# Patient Record
Sex: MALE | Race: WHITE | NOT HISPANIC OR LATINO | Employment: OTHER | URBAN - METROPOLITAN AREA
[De-identification: names, ages, dates, MRNs, and addresses within clinical notes are randomized per-mention and may not be internally consistent; named-entity substitution may affect disease eponyms.]

---

## 2017-07-18 ENCOUNTER — ALLSCRIPTS OFFICE VISIT (OUTPATIENT)
Dept: OTHER | Facility: OTHER | Age: 82
End: 2017-07-18

## 2017-07-18 ENCOUNTER — APPOINTMENT (OUTPATIENT)
Dept: LAB | Facility: HOSPITAL | Age: 82
End: 2017-07-18
Attending: UROLOGY
Payer: COMMERCIAL

## 2017-07-18 DIAGNOSIS — C67.8 MALIGNANT NEOPLASM OF OVERLAPPING SITES OF BLADDER (HCC): ICD-10-CM

## 2017-07-18 LAB
BILIRUB UR QL STRIP: NORMAL
CLARITY UR: NORMAL
COLOR UR: YELLOW
GLUCOSE (HISTORICAL): NORMAL
HGB UR QL STRIP.AUTO: NORMAL
KETONES UR STRIP-MCNC: NORMAL MG/DL
LEUKOCYTE ESTERASE UR QL STRIP: NORMAL
NITRITE UR QL STRIP: NORMAL
PH UR STRIP.AUTO: 5.5 [PH]
PROT UR STRIP-MCNC: NORMAL MG/DL
SP GR UR STRIP.AUTO: 1.02
UROBILINOGEN UR QL STRIP.AUTO: 1

## 2017-07-18 PROCEDURE — 88112 CYTOPATH CELL ENHANCE TECH: CPT

## 2017-08-28 ENCOUNTER — APPOINTMENT (EMERGENCY)
Dept: CT IMAGING | Facility: HOSPITAL | Age: 82
End: 2017-08-28
Payer: COMMERCIAL

## 2017-08-28 ENCOUNTER — HOSPITAL ENCOUNTER (EMERGENCY)
Facility: HOSPITAL | Age: 82
Discharge: HOME | End: 2017-08-29
Attending: EMERGENCY MEDICINE | Admitting: EMERGENCY MEDICINE
Payer: COMMERCIAL
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
## 2017-08-28 DIAGNOSIS — F10.929 ACUTE ALCOHOL INTOXICATION (HCC): Primary | ICD-10-CM

## 2017-08-28 LAB — ETHANOL EXG-MCNC: 0.11 MG/DL

## 2017-08-28 PROCEDURE — 70450 CT HEAD/BRAIN W/O DYE: CPT

## 2017-08-28 PROCEDURE — 82075 ASSAY OF BREATH ETHANOL: CPT | Performed by: EMERGENCY MEDICINE

## 2017-08-28 RX ORDER — ATORVASTATIN CALCIUM 40 MG/1
40 TABLET, FILM COATED ORAL
COMMUNITY
Start: 2016-02-03

## 2017-08-28 RX ORDER — CARVEDILOL 12.5 MG/1
12.5 TABLET ORAL
COMMUNITY
Start: 2016-02-03

## 2017-08-28 RX ORDER — LOSARTAN POTASSIUM 100 MG/1
100 TABLET ORAL
COMMUNITY
End: 2020-01-28 | Stop reason: SDUPTHER

## 2017-08-28 RX ORDER — METFORMIN HYDROCHLORIDE 1000 MG/1
1000 TABLET, FILM COATED, EXTENDED RELEASE ORAL
COMMUNITY

## 2017-08-29 VITALS
OXYGEN SATURATION: 94 % | HEART RATE: 76 BPM | RESPIRATION RATE: 16 BRPM | TEMPERATURE: 97.5 F | DIASTOLIC BLOOD PRESSURE: 99 MMHG | SYSTOLIC BLOOD PRESSURE: 173 MMHG

## 2017-08-29 LAB — ETHANOL EXG-MCNC: 0.09 MG/DL

## 2017-08-29 PROCEDURE — 82075 ASSAY OF BREATH ETHANOL: CPT | Performed by: EMERGENCY MEDICINE

## 2017-08-29 PROCEDURE — 99284 EMERGENCY DEPT VISIT MOD MDM: CPT

## 2017-11-20 ENCOUNTER — APPOINTMENT (OUTPATIENT)
Dept: LAB | Facility: HOSPITAL | Age: 82
End: 2017-11-20
Payer: COMMERCIAL

## 2017-11-20 ENCOUNTER — APPOINTMENT (OUTPATIENT)
Dept: LAB | Facility: HOSPITAL | Age: 82
End: 2017-11-20
Attending: UROLOGY
Payer: COMMERCIAL

## 2017-11-20 ENCOUNTER — ALLSCRIPTS OFFICE VISIT (OUTPATIENT)
Dept: OTHER | Facility: OTHER | Age: 82
End: 2017-11-20

## 2017-11-20 DIAGNOSIS — C67.8 MALIGNANT NEOPLASM OF OVERLAPPING SITES OF BLADDER (HCC): Primary | ICD-10-CM

## 2017-11-20 LAB
BILIRUB UR QL STRIP: NORMAL
CLARITY UR: NORMAL
COLOR UR: YELLOW
GLUCOSE (HISTORICAL): NEGATIVE
HGB UR QL STRIP.AUTO: NEGATIVE
KETONES UR STRIP-MCNC: NORMAL MG/DL
LEUKOCYTE ESTERASE UR QL STRIP: NEGATIVE
NITRITE UR QL STRIP: NEGATIVE
PH UR STRIP.AUTO: 6 [PH]
PROT UR STRIP-MCNC: NORMAL MG/DL
SP GR UR STRIP.AUTO: 1.02
UROBILINOGEN UR QL STRIP.AUTO: 1

## 2017-11-20 PROCEDURE — 88112 CYTOPATH CELL ENHANCE TECH: CPT | Performed by: UROLOGY

## 2018-01-13 VITALS
HEIGHT: 70 IN | DIASTOLIC BLOOD PRESSURE: 88 MMHG | WEIGHT: 223 LBS | BODY MASS INDEX: 31.92 KG/M2 | SYSTOLIC BLOOD PRESSURE: 160 MMHG

## 2018-01-14 VITALS
DIASTOLIC BLOOD PRESSURE: 84 MMHG | WEIGHT: 219.38 LBS | SYSTOLIC BLOOD PRESSURE: 152 MMHG | BODY MASS INDEX: 31.48 KG/M2

## 2018-03-26 ENCOUNTER — OFFICE VISIT (OUTPATIENT)
Dept: UROLOGY | Facility: MEDICAL CENTER | Age: 83
End: 2018-03-26
Payer: COMMERCIAL

## 2018-03-26 VITALS
DIASTOLIC BLOOD PRESSURE: 84 MMHG | SYSTOLIC BLOOD PRESSURE: 138 MMHG | WEIGHT: 221 LBS | BODY MASS INDEX: 30.94 KG/M2 | HEIGHT: 71 IN

## 2018-03-26 DIAGNOSIS — Z85.51 HISTORY OF BLADDER CANCER: ICD-10-CM

## 2018-03-26 DIAGNOSIS — C67.2 MALIGNANT NEOPLASM OF LATERAL WALL OF BLADDER (HCC): Primary | ICD-10-CM

## 2018-03-26 DIAGNOSIS — Z71.89 OTHER SPECIFIED COUNSELING: ICD-10-CM

## 2018-03-26 DIAGNOSIS — Z85.51 HISTORY OF BLADDER CANCER: Primary | ICD-10-CM

## 2018-03-26 LAB
SL AMB  POCT GLUCOSE, UA: NEGATIVE
SL AMB LEUKOCYTE ESTERASE,UA: NEGATIVE
SL AMB POCT BILIRUBIN,UA: NEGATIVE
SL AMB POCT BLOOD,UA: NEGATIVE
SL AMB POCT CLARITY,UA: CLEAR
SL AMB POCT COLOR,UA: YELLOW
SL AMB POCT KETONES,UA: ABNORMAL
SL AMB POCT NITRITE,UA: NEGATIVE
SL AMB POCT PH,UA: 5.5
SL AMB POCT SPECIFIC GRAVITY,UA: 1.02
SL AMB POCT URINE PROTEIN: ABNORMAL
SL AMB POCT UROBILINOGEN: 0.2

## 2018-03-26 PROCEDURE — 52000 CYSTOURETHROSCOPY: CPT | Performed by: UROLOGY

## 2018-03-26 PROCEDURE — 88112 CYTOPATH CELL ENHANCE TECH: CPT | Performed by: PATHOLOGY

## 2018-03-26 PROCEDURE — 81003 URINALYSIS AUTO W/O SCOPE: CPT | Performed by: UROLOGY

## 2018-03-26 PROCEDURE — 99213 OFFICE O/P EST LOW 20 MIN: CPT | Performed by: UROLOGY

## 2018-03-26 RX ORDER — FLUOXETINE HYDROCHLORIDE 40 MG/1
CAPSULE ORAL DAILY
COMMUNITY
Start: 2018-03-05

## 2018-03-26 RX ORDER — METFORMIN HYDROCHLORIDE 500 MG/1
TABLET, EXTENDED RELEASE ORAL DAILY
COMMUNITY
Start: 2018-02-13

## 2018-03-26 RX ORDER — LOSARTAN POTASSIUM AND HYDROCHLOROTHIAZIDE 25; 100 MG/1; MG/1
TABLET ORAL DAILY
COMMUNITY

## 2018-03-26 RX ORDER — UBIDECARENONE 75 MG
CAPSULE ORAL
COMMUNITY

## 2018-03-26 NOTE — PROGRESS NOTES
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)  -     POCT urine dip auto non-scope    History of bladder cancer  -     Cytology, urine    Other orders  -     B Complex Vitamins (B COMPLEX 1 PO); Take 1 tablet by mouth  -     Calcium Carb-Ergocalciferol 250-125 MG-UNIT TABS; Take 1 tablet by mouth daily  -     FLUoxetine (PROzac) 40 MG capsule; daily  -     sitaGLIPtin (JANUVIA) 25 mg tablet; Take by mouth  -     losartan-hydrochlorothiazide (HYZAAR) 100-25 MG per tablet; Take by mouth daily  -     metFORMIN (GLUCOPHAGE-XR) 500 mg 24 hr tablet; daily  -     cyanocobalamin (VITAMIN B-12) 100 mcg tablet; Take by mouth          Subjective:      Patient ID: Ariella Campbell is a 80 y o  male  HPI    Bladder cancer: 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  His recent positive biopsy was in March 2016 when he 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  Four other sites were negative  Repeat biopsies in March 2017 showed dysplasia but no neoplasm  Currently the patient has no signs or symptoms of recurrent disease  His cystoscopy is essentially negative  The full report appears below  History of prostate cancer: The patient was radiated for prostate cancer in 2003 for Daniel 9 prostate cancer  PSA at the time was about 16  Currently no evidence of disease  The patient is accompanied by his wife    The following portions of the patient's history were reviewed and updated as appropriate: allergies, current medications, past family history, past medical history, past social history, past surgical history and problem list     Review of Systems   Constitutional: Negative for activity change and fatigue  Respiratory: Negative for shortness of breath and wheezing  Cardiovascular: Negative for chest pain  Gastrointestinal: Negative for abdominal pain  Genitourinary: Negative for difficulty urinating, dysuria, frequency, hematuria and urgency  Musculoskeletal: Negative for back pain and gait problem  Skin: Negative  Allergic/Immunologic: Negative  Neurological: Negative  Psychiatric/Behavioral: Negative  Objective:      /84 (BP Location: Left arm, Patient Position: Sitting, Cuff Size: Standard)   Ht 5' 11" (1 803 m)   Wt 100 kg (221 lb)   BMI 30 82 kg/m²          Physical Exam   Constitutional: He is oriented to person, place, and time  He appears well-developed and well-nourished  HENT:   Head: Normocephalic and atraumatic  Neck: Normal range of motion  Neck supple  Pulmonary/Chest: Effort normal    Genitourinary:   Genitourinary Comments: Normal penis, testes, scrotum   Musculoskeletal: Normal range of motion  Neurological: He is alert and oriented to person, place, and time  He has normal reflexes  Skin: Skin is warm and dry  Psychiatric: He has a normal mood and affect  His behavior is normal  Judgment and thought content normal                MALE FLEXIBLE CYSTOSCOPY    The patient was prepped and draped in sterile and 10 mL of 1% xylocaine jelly was instilled into the urethra  A penile clamp was applied  Penile Urethra:normal  Bulbar Urethra:normal  Prostate: Showed evidence of prior radiation therapy for prostate cancer  The lumen is patent  There is pallor of the mucosa  Bladder:        Bladder neck normal       Ureteral orifices normal       Mucosa generally normal with some patchy hyperemia on the floor consistent with post radiation effect, no obvious neoplasm         Trabeculation moderate  Impression:  No visible recurrent bladder cancer

## 2018-03-26 NOTE — LETTER
March 26, 2018     Maryan RyanDO  [facility] [id number]    Patient: Que Porter   YOB: 1934   Date of Visit: 3/26/2018       Dear Dr Grady Gardner: Thank you for referring Eda Ortega to me for evaluation  Below are my notes for this consultation  If you have questions, please do not hesitate to call me  I look forward to following your patient along with you  Sincerely,        Noemi Henning MD        CC: No Recipients  Noemi Henning MD  3/26/2018  5:11 PM  Sign at close encounter  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)  -     POCT urine dip auto non-scope    History of bladder cancer  -     Cytology, urine    Other orders  -     B Complex Vitamins (B COMPLEX 1 PO); Take 1 tablet by mouth  -     Calcium Carb-Ergocalciferol 250-125 MG-UNIT TABS; Take 1 tablet by mouth daily  -     FLUoxetine (PROzac) 40 MG capsule; daily  -     sitaGLIPtin (JANUVIA) 25 mg tablet; Take by mouth  -     losartan-hydrochlorothiazide (HYZAAR) 100-25 MG per tablet; Take by mouth daily  -     metFORMIN (GLUCOPHAGE-XR) 500 mg 24 hr tablet; daily  -     cyanocobalamin (VITAMIN B-12) 100 mcg tablet; Take by mouth          Subjective:      Patient ID: Que Porter is a 80 y o  male  HPI    Bladder cancer: 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  His recent positive biopsy was in March 2016 when he 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  Four other sites were negative  Repeat biopsies in March 2017 showed dysplasia but no neoplasm  Currently the patient has no signs or symptoms of recurrent disease    His cystoscopy is essentially negative  The full report appears below  History of prostate cancer: The patient was radiated for prostate cancer in 2003 for Daniel 9 prostate cancer  PSA at the time was about 16  Currently no evidence of disease  The patient is accompanied by his wife  The following portions of the patient's history were reviewed and updated as appropriate: allergies, current medications, past family history, past medical history, past social history, past surgical history and problem list     Review of Systems   Constitutional: Negative for activity change and fatigue  Respiratory: Negative for shortness of breath and wheezing  Cardiovascular: Negative for chest pain  Gastrointestinal: Negative for abdominal pain  Genitourinary: Negative for difficulty urinating, dysuria, frequency, hematuria and urgency  Musculoskeletal: Negative for back pain and gait problem  Skin: Negative  Allergic/Immunologic: Negative  Neurological: Negative  Psychiatric/Behavioral: Negative  Objective:      /84 (BP Location: Left arm, Patient Position: Sitting, Cuff Size: Standard)   Ht 5' 11" (1 803 m)   Wt 100 kg (221 lb)   BMI 30 82 kg/m²           Physical Exam   Constitutional: He is oriented to person, place, and time  He appears well-developed and well-nourished  HENT:   Head: Normocephalic and atraumatic  Neck: Normal range of motion  Neck supple  Pulmonary/Chest: Effort normal    Genitourinary:   Genitourinary Comments: Normal penis, testes, scrotum   Musculoskeletal: Normal range of motion  Neurological: He is alert and oriented to person, place, and time  He has normal reflexes  Skin: Skin is warm and dry  Psychiatric: He has a normal mood and affect  His behavior is normal  Judgment and thought content normal                MALE FLEXIBLE CYSTOSCOPY    The patient was prepped and draped in sterile and 10 mL of 1% xylocaine jelly was instilled into the urethra    A penile clamp was applied  Penile Urethra:normal  Bulbar Urethra:normal  Prostate: Showed evidence of prior radiation therapy for prostate cancer  The lumen is patent  There is pallor of the mucosa  Bladder:        Bladder neck normal       Ureteral orifices normal       Mucosa generally normal with some patchy hyperemia on the floor consistent with post radiation effect, no obvious neoplasm         Trabeculation moderate  Impression:  No visible recurrent bladder cancer

## 2018-03-26 NOTE — PROGRESS NOTES
IPSS Questionnaire (AUA-7): Over the past month    1)  How often have you had a sensation of not emptying your bladder completely after you finish urinating? 1 - Less than 1 time in 5   2)  How often have you had to urinate again less than two hours after you finished urinating? 1 - Less than 1 time in 5   3)  How often have you found you stopped and started again several times when you urinated? 2 - Less than half the time   4) How difficult have you found it to postpone urination? 1 - Less than 1 time in 5   5) How often have you had a weak urinary stream?  2 - Less than half the time   6) How often have you had to push or strain to begin urination? 1 - Less than 1 time in 5   7) How many times did you most typically get up to urinate from the time you went to bed until the time you got up in the morning? 2 - 2 times   Total Score:  10     QOL: Pleased

## 2018-09-22 ENCOUNTER — HOSPITAL ENCOUNTER (EMERGENCY)
Facility: HOSPITAL | Age: 83
Discharge: HOME | End: 2018-09-22
Attending: EMERGENCY MEDICINE
Payer: COMMERCIAL

## 2018-09-22 VITALS
WEIGHT: 211.64 LBS | SYSTOLIC BLOOD PRESSURE: 163 MMHG | RESPIRATION RATE: 18 BRPM | OXYGEN SATURATION: 95 % | TEMPERATURE: 98.5 F | HEART RATE: 78 BPM | DIASTOLIC BLOOD PRESSURE: 74 MMHG | BODY MASS INDEX: 29.52 KG/M2

## 2018-09-22 DIAGNOSIS — R42 EPISODIC LIGHTHEADEDNESS: ICD-10-CM

## 2018-09-22 DIAGNOSIS — R11.0 NAUSEA: Primary | ICD-10-CM

## 2018-09-22 DIAGNOSIS — T50.Z95A ADVERSE EFFECT OF VACCINE, INITIAL ENCOUNTER: ICD-10-CM

## 2018-09-22 LAB
BILIRUB UR QL STRIP: NEGATIVE
CLARITY UR: CLEAR
COLOR UR: YELLOW
COLOR, POC: YELLOW
GLUCOSE UR STRIP-MCNC: NEGATIVE MG/DL
HGB UR QL STRIP.AUTO: NEGATIVE
KETONES UR STRIP-MCNC: NEGATIVE MG/DL
LEUKOCYTE ESTERASE UR QL STRIP: NEGATIVE
NITRITE UR QL STRIP: NEGATIVE
PH UR STRIP.AUTO: 5.5 [PH] (ref 4.5–8)
PROT UR STRIP-MCNC: NEGATIVE MG/DL
SP GR UR STRIP.AUTO: 1.02 (ref 1–1.03)
UROBILINOGEN UR QL STRIP.AUTO: 0.2 E.U./DL

## 2018-09-22 PROCEDURE — 93005 ELECTROCARDIOGRAM TRACING: CPT

## 2018-09-22 PROCEDURE — 81003 URINALYSIS AUTO W/O SCOPE: CPT

## 2018-09-22 PROCEDURE — 99284 EMERGENCY DEPT VISIT MOD MDM: CPT

## 2018-09-22 RX ORDER — ONDANSETRON 4 MG/1
4 TABLET, ORALLY DISINTEGRATING ORAL EVERY 8 HOURS PRN
Qty: 20 TABLET | Refills: 0 | Status: SHIPPED | OUTPATIENT
Start: 2018-09-22 | End: 2018-09-29

## 2018-09-22 RX ORDER — ONDANSETRON 4 MG/1
4 TABLET, ORALLY DISINTEGRATING ORAL ONCE
Status: COMPLETED | OUTPATIENT
Start: 2018-09-22 | End: 2018-09-22

## 2018-09-22 RX ADMIN — ONDANSETRON 4 MG: 4 TABLET, ORALLY DISINTEGRATING ORAL at 22:18

## 2018-09-23 NOTE — DISCHARGE INSTRUCTIONS
Acute Nausea and Vomiting   WHAT YOU NEED TO KNOW:   Acute nausea and vomiting start suddenly, worsen quickly, and last a short time  DISCHARGE INSTRUCTIONS:   Seek care immediately if:   · You see blood in your vomit or your bowel movements  · You have sudden, severe pain in your chest and upper abdomen after hard vomiting or retching  · You have swelling in your neck and chest      · You are dizzy, cold, and thirsty and your eyes and mouth are dry  · You are urinating very little or not at all  · You have muscle weakness, leg cramps, and trouble breathing  · Your heart is beating much faster than normal      · You continue to vomit for more than 48 hours  Contact your healthcare provider if:   · You have frequent dry heaves (vomiting but nothing comes out)  · Your nausea and vomiting does not get better or go away after you use medicine  · You have questions or concerns about your condition or treatment  Medicines: You may need any of the following:  · Medicines  may be given to calm your stomach and stop your vomiting  You may also need medicines to help you feel more relaxed or to stop nausea and vomiting caused by motion sickness  · Gastrointestinal stimulants  are used to help empty your stomach and bowels  This may help decrease nausea and vomiting  · Take your medicine as directed  Contact your healthcare provider if you think your medicine is not helping or if you have side effects  Tell him or her if you are allergic to any medicine  Keep a list of the medicines, vitamins, and herbs you take  Include the amounts, and when and why you take them  Bring the list or the pill bottles to follow-up visits  Carry your medicine list with you in case of an emergency  Prevent or manage acute nausea and vomiting:   · Do not drink alcohol  Alcohol may upset or irritate your stomach  Too much alcohol can also cause acute nausea and vomiting  · Control stress    Headaches due to stress may cause nausea and vomiting  Find ways to relax and manage your stress  Get more rest and sleep  · Drink more liquids as directed  Vomiting can lead to dehydration  It is important to drink more liquids to help replace lost body fluids  Ask your healthcare provider how much liquid to drink each day and which liquids are best for you  Your provider may recommend that you drink an oral rehydration solution (ORS)  ORS contains water, salts, and sugar that are needed to replace the lost body fluids  Ask what kind of ORS to use, how much to drink, and where to get it  · Eat smaller meals, more often  Eat small amounts of food every 2 to 3 hours, even if you are not hungry  Food in your stomach may decrease your nausea  · Talk to your healthcare provider before you take over-the-counter (OTC) medicines  These medicines can cause serious problems if you use certain other medicines, or you have a medical condition  You may have problems if you use too much or use them for longer than the label says  Follow directions on the label carefully  Follow up with your healthcare provider as directed:  Write down your questions so you remember to ask them during your visits  © 2017 [employer]  Information is for End User's use only and may not be sold, redistributed or otherwise used for commercial purposes  All illustrations and images included in CareNotes® are the copyrighted property of [employer]  or [employer]  The above information is an  only  It is not intended as medical advice for individual conditions or treatments  Talk to your doctor, nurse or pharmacist before following any medical regimen to see if it is safe and effective for you  Lightheadedness   WHAT YOU NEED TO KNOW:   Lightheadedness is the feeling that you may faint, but you do not  Your heartbeat may be fast or feel like it flutters   Lightheadedness may occur when you take certain medicines, such as medicine to lower your blood pressure  Dehydration, low sodium, low blood sugar, an abnormal heart rhythm, and anxiety are other common causes  DISCHARGE INSTRUCTIONS:   Return to the emergency department if:   · You have sudden chest pain  · You have trouble breathing or shortness of breath  · You have vision changes, are sweating, and have nausea while you are sitting or lying down  · You feel flushed and your heart is fluttering  · You faint  Contact your healthcare provider if:   · You feel lightheaded often  · Your heart beats faster or slower than usual      · You have questions or concerns about your condition or care  Follow up with your healthcare provider as directed: You may need more tests to help find the cause of your lightheadedness  The tests will help healthcare providers plan the best treatment for you  Write down your questions so you remember to ask them during your visits  Self-care:  Talk with your healthcare provider about these and other ways to manage your symptoms:  · Lie down  when you feel lightheaded, your throat gets tight, or your vision changes  Raise your legs above the level of your heart  · Stand up slowly  Sit on the side of the bed or couch for a few minutes before you stand up  · Take slow, deep breaths when you feel lightheaded  This can help decrease the feeling that you might faint  · Ask if you need to avoid hot baths and saunas  These may make your symptoms worse  Watch for signs of low blood sugar: These include hunger, nervousness, sweating, and fast or fluttery heartbeats  Talk with your healthcare provider about ways to keep your blood sugar level steady  Check your blood pressure often:  You should do this especially if you take medicine to lower your blood pressure  Check your blood pressure when you are lying down and when you are standing  Ask how often to check during the day   Keep a record of your blood pressure numbers  Your healthcare provider may use the record to help plan your treatment  Keep a record of your lightheadedness episodes:  Include your symptoms and your activity before and after the episode  The record can help your healthcare provider find the cause of your lightheadedness and help you manage episodes  © 2017 [employer] Information is for End User's use only and may not be sold, redistributed or otherwise used for commercial purposes  All illustrations and images included in CareNotes® are the copyrighted property of [employer]  or [employer] Casiano  The above information is an  only  It is not intended as medical advice for individual conditions or treatments  Talk to your doctor, nurse or pharmacist before following any medical regimen to see if it is safe and effective for you

## 2018-09-23 NOTE — ED NOTES
Pt given cracker and water   Will PO challenge and ambulate Pt again before discharging     Eliana Walters RN  09/22/18 [id number]

## 2018-09-23 NOTE — ED NOTES
Ambulated well with no problems  States he has no nausea   Comfortable with going home     [place]  09/22/18 [id number]

## 2018-09-23 NOTE — ED PROVIDER NOTES
History  Chief Complaint   Patient presents with    Medical Problem     Had flu shot this AM  Nausea and lightheaded PTA  Pt arrived A+Ox3, denies SOB, pain, nausea, or any complaints  79 yo male who got the flu shot around noon today and this evening began with nausea and lightheadedness  Pt reports symptoms lasted an hour - EMS called and currently feels fine  Wants to go home  VSS, well hydrated, nml exam, NAD  History provided by:  Patient   used: No    Medical Problem   Severity:  Moderate  Onset quality:  Gradual  Duration:  1 hour  Timing:  Constant  Progression:  Resolved  Chronicity:  New  Associated symptoms: nausea    Associated symptoms: no abdominal pain, no chest pain, no congestion, no cough, no diarrhea, no ear pain, no fatigue, no fever, no headaches, no loss of consciousness, no myalgias, no rash, no rhinorrhea, no shortness of breath, no sore throat, no vomiting and no wheezing    Associated symptoms comment:  Lightheaded      Prior to Admission Medications   Prescriptions Last Dose Informant Patient Reported? Taking? B Complex Vitamins (B COMPLEX 1 PO)   Yes No   Sig: Take 1 tablet by mouth   Calcium Carb-Ergocalciferol 250-125 MG-UNIT TABS   Yes No   Sig: Take 1 tablet by mouth daily   FLUOXETINE HCL PO   Yes No   Sig: Take 20 mg by mouth   FLUoxetine (PROzac) 40 MG capsule   Yes No   Sig: daily   aspirin 81 MG tablet   Yes No   Sig: Take 81 mg by mouth daily     atorvastatin (LIPITOR) 40 mg tablet   Yes No   Sig: Take 40 mg by mouth   carvedilol (COREG) 12 5 mg tablet   Yes No   Sig: Take 12 5 mg by mouth   cyanocobalamin (VITAMIN B-12) 100 mcg tablet   Yes No   Sig: Take by mouth   losartan (COZAAR) 100 MG tablet   Yes No   Sig: Take 100 mg by mouth   losartan-hydrochlorothiazide (HYZAAR) 100-25 MG per tablet   Yes No   Sig: Take by mouth daily   metFORMIN (GLUCOPHAGE-XR) 500 mg 24 hr tablet   Yes No   Sig: daily   metFORMIN (GLUMETZA) 1000 MG (MOD) 24 hr tablet   Yes No   Sig: Take 1,000 mg by mouth   sitaGLIPtin (JANUVIA) 25 mg tablet   Yes No   Sig: Take by mouth      Facility-Administered Medications: None       Past Medical History:   Diagnosis Date    Bladder trabeculation     Diabetes mellitus ([facility] )     Hyperlipidemia     Hypertension     Irradiation cystitis     [facility]     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)     Malignant ne[place] of overlapping sites of bladder ([place] [address] )     Malignant neoplasm prostate ([place] [address] )     Nocturia     Stroke ([place] [address] )     Type 2 diabetes mellitus ([place] [address] )        Past Surgical History:   Procedure Laterality Date    APPENDECTOMY      BLADDER FULGURATION  2010    < 0 5 cm    CATARACT EXTRACTION, BILATERAL Bilateral     CYSTOSCOPY  2011, 2012, 2013, 2014, 2015, 2016, 2017    CYSTOSCOPY W/ RETROGRADES Bilateral 2015    CYSTOSTOMY W/ BLADDER BIOPSY  2015, 2016    GASTRECTOMY      GASTRIC BYPASS      for obesity  [id number]  [facility] [place] BLADDER TUMOR  2002, 2009, 2010       History reviewed  No pertinent family history  I have reviewed and agree with the history as documented  Social History   Substance Use Topics    Smoking status: Former Smoker     Types: Cigarettes     Quit date: 1997    Smokeless tobacco: Never Used    Alcohol use Yes      Comment: [email or web address]        Review of Systems   Constitutional: Negative for chills, fatigue and fever  HENT: Negative for congestion, ear pain, rhinorrhea and sore throat  Eyes: Negative for visual disturbance  Respiratory: Negative for cough, shortness of breath and wheezing  Cardiovascular: Negative for chest pain and palpitations  Gastrointestinal: Positive for nausea  Negative for abdominal pain, diarrhea and vomiting  Genitourinary: Negative for dysuria  Musculoskeletal: Negative for myalgias, neck pain and neck stiffness  Skin: Negative for pallor and rash  Neurological: Positive for light-headedness  Negative for dizziness, seizures, loss of consciousness, facial asymmetry, speech difficulty and headaches  Psychiatric/Behavioral: Negative for confusion  All other systems reviewed and are negative  Physical Exam  Physical Exam   Constitutional: He is oriented to person, place, and time  He appears well-developed and well-nourished  No distress  HENT:   Head: Normocephalic and atraumatic  Right Ear: External ear normal    Left Ear: External ear normal    Mouth/Throat: Oropharynx is clear and moist    Eyes: EOM are normal  Pupils are equal, round, and reactive to light  Neck: Normal range of motion  Neck supple  Cardiovascular: Normal rate and regular rhythm  No murmur heard  Pulmonary/Chest: Effort normal and breath sounds normal  He exhibits no tenderness  Abdominal: Soft  Bowel sounds are normal  He exhibits no distension  There is no tenderness  Musculoskeletal: Normal range of motion  He exhibits no edema  Neurological: He is alert and oriented to person, place, and time  He displays normal reflexes  No cranial nerve deficit or sensory deficit  He exhibits normal muscle tone  Coordination normal    Skin: Skin is warm  Capillary refill takes less than 2 seconds  No rash noted  No pallor  Psychiatric: He has a normal mood and affect  His behavior is normal    Nursing note and vitals reviewed        Vital Signs  ED Triage Vitals [09/22/18 2200]   Temperature Pulse Respirations Blood Pressure SpO2   98 5 °F (36 9 °C) 78 18 163/74 95 %      Temp src Heart Rate Source Patient Position - Orthostatic VS BP Location FiO2 (%)   -- -- Lying Left arm --      Pain Score       No Pain           Vitals:    09/22/18 2200   BP: 163/74   Pulse: 78   Patient Position - Orthostatic VS: Lying       Visual Acuity  Visual Acuity      Most Recent Value   L Pupil Size (mm)  6   R Pupil Size (mm)  6          ED Medications  Medications   ondansetron (ZOFRAN-ODT) dispersible tablet 4 mg (4 mg Oral Given 9/22/18 2218)       Diagnostic Studies  Results Reviewed     Procedure Component Value Units Date/Time    POCT urinalysis dipstick [[id number]]  (Normal) Resulted:  09/22/18 2237    Lab Status:  Final result Updated:  09/22/18 2237     Color, UA yellow    ED Urine Macroscopic [[id number]] Collected:  09/22/18 2243    Lab Status:  Final result Specimen:  Urine Updated:  09/22/18 2233     Color, UA Yellow     Clarity, UA Clear     pH, UA 5 5     Leukocytes, UA Negative     Nitrite, UA Negative     Protein, UA Negative mg/dl      Glucose, UA Negative mg/dl      Ketones, UA Negative mg/dl      Urobilinogen, UA 0 2 E U /dl      Bilirubin, UA Negative     Blood, UA Negative     Specific Gravity, UA 1 020    Narrative:       CLINITEK RESULT                 No orders to display              Procedures  ECG 12 Lead Documentation  Date/Time: 9/22/2018 10:05 PM  Performed by: Jamshid Siddiqui by: Arron Vasquez     Indications / Diagnosis:  Nausea  Patient location:  ED  Interpretation:     Interpretation: normal    Rate:     ECG rate:  76    ECG rate assessment: normal    Rhythm:     Rhythm: sinus rhythm    Ectopy:     Ectopy: none    QRS:     QRS axis:  Left    QRS intervals: Wide (RBBB)  Conduction:     Conduction: normal    ST segments:     ST segments:  Normal  T waves:     T waves: normal    Other findings:     Other findings: LVH             Phone Contacts  ED Phone Contact    ED Course  ED Course as of Sep 23 0400   Sat Sep 22, 2018   2203 Pt seen and examined  79 yo male who got the flu shot around noon today and this evening began with nausea and lightheadedness  Pt reports symptoms lasted an hour - EMS called and currently feels fine  Wants to go home  VSS, well hydrated, nml exam, NAD  Will ambulate  2217 Pt ambulated without any difficulty, c/o mild nausea - ODT zofran ordered  18 Pt no longer with nausea and wishes to go, will d/c home with wife and son  MDM  CritCare Time    Disposition  Final diagnoses:   Nausea   Episodic lightheadedness   Adverse effect of vaccine, initial encounter     Time reflects when diagnosis was documented in both MDM as applicable and the Disposition within this note     Time User Action Codes Description Comment    9/22/2018 10:36 PM [place] Covert Add [R11 0] Nausea     9/22/2018 10:36 PM Santiago Covert Add [R42] Episodic lightheadedness     9/22/2018 10:37 PM Santiago Covert Add [T50  Z95A] Adverse effect of vaccine, initial encounter       ED Disposition     ED Disposition Condition Comment    Discharge  Yassine Garcias discharge to home/self care  Condition at discharge: Good        Follow-up Information     Follow up With Specialties Details Why Contact Pino Cook, DO Family Medicine Call As needed Juma [address]            Discharge Medication List as of 9/22/2018 10:37 PM      START taking these medications    Details   ondansetron (ZOFRAN-ODT) 4 mg disintegrating tablet Take 1 tablet (4 mg total) by mouth every 8 (eight) hours as needed for nausea or vomiting for up to 7 days, Starting Sat 9/22/2018, Until Sat 9/29/2018, Print         CONTINUE these medications which have NOT CHANGED    Details   aspirin 81 MG tablet Take 81 mg by mouth daily  , Until Discontinued, Historical Med      atorvastatin (LIPITOR) 40 mg tablet Take 40 mg by mouth, Starting Wed 2/3/2016, Historical Med      B Complex Vitamins (B COMPLEX 1 PO) Take 1 tablet by mouth, Historical Med      Calcium Carb-Ergocalciferol 250-125 MG-UNIT TABS Take 1 tablet by mouth daily, Historical Med      carvedilol (COREG) 12 5 mg tablet Take 12 5 mg by mouth, Starting Wed 2/3/2016, Historical Med      cyanocobalamin (VITAMIN B-12) 100 mcg tablet Take by mouth, Historical Med      !!  FLUoxetine (PROzac) 40 MG capsule daily, Starting Mon 3/5/2018, Historical Med      !! FLUOXETINE HCL PO Take 20 mg by mouth, Historical Med      losartan (COZAAR) 100 MG tablet Take 100 mg by mouth, Historical Med      losartan-hydrochlorothiazide (HYZAAR) 100-25 MG per tablet Take by mouth daily, Historical Med      !! metFORMIN (GLUCOPHAGE-XR) 500 mg 24 hr tablet daily, Starting Tue 2/13/2018, Historical Med      !! metFORMIN (GLUMETZA) 1000 MG (MOD) 24 hr tablet Take 1,000 mg by mouth, Historical Med      sitaGLIPtin (JANUVIA) 25 mg tablet Take by mouth, Historical Med       !! - Potential duplicate medications found  Please discuss with provider  No discharge procedures on file      ED Provider  Electronically Signed by           Jake Hook DO  09/23/18 0352

## 2018-09-24 LAB
ATRIAL RATE: 76 BPM
P AXIS: 53 DEGREES
PR INTERVAL: 200 MS
QRS AXIS: -61 DEGREES
QRSD INTERVAL: 152 MS
QT INTERVAL: 436 MS
QTC INTERVAL: 490 MS
T WAVE AXIS: 66 DEGREES
VENTRICULAR RATE: 76 BPM

## 2018-09-24 PROCEDURE — 93010 ELECTROCARDIOGRAM REPORT: CPT | Performed by: INTERNAL MEDICINE

## 2019-01-25 ENCOUNTER — PROCEDURE VISIT (OUTPATIENT)
Dept: UROLOGY | Facility: MEDICAL CENTER | Age: 84
End: 2019-01-25
Payer: COMMERCIAL

## 2019-01-25 VITALS — HEIGHT: 71 IN | BODY MASS INDEX: 29.4 KG/M2 | WEIGHT: 210 LBS

## 2019-01-25 DIAGNOSIS — C67.2 MALIGNANT NEOPLASM OF LATERAL WALL OF URINARY BLADDER (HCC): Primary | ICD-10-CM

## 2019-01-25 LAB
SL AMB  POCT GLUCOSE, UA: ABNORMAL
SL AMB LEUKOCYTE ESTERASE,UA: ABNORMAL
SL AMB POCT BILIRUBIN,UA: ABNORMAL
SL AMB POCT BLOOD,UA: ABNORMAL
SL AMB POCT CLARITY,UA: CLEAR
SL AMB POCT COLOR,UA: ABNORMAL
SL AMB POCT KETONES,UA: ABNORMAL
SL AMB POCT NITRITE,UA: ABNORMAL
SL AMB POCT PH,UA: 5.5
SL AMB POCT SPECIFIC GRAVITY,UA: 1.02
SL AMB POCT URINE PROTEIN: 30
SL AMB POCT UROBILINOGEN: 0.2

## 2019-01-25 PROCEDURE — 88112 CYTOPATH CELL ENHANCE TECH: CPT | Performed by: PATHOLOGY

## 2019-01-25 PROCEDURE — 81003 URINALYSIS AUTO W/O SCOPE: CPT | Performed by: UROLOGY

## 2019-01-25 PROCEDURE — 52000 CYSTOURETHROSCOPY: CPT | Performed by: UROLOGY

## 2019-01-25 NOTE — LETTER
January 25, 2019     [phone]    Patient: Willam Godinez   YOB: 1934   Date of Visit: 1/25/2019       Dear Dr Mark Pierson: Thank you for referring Abbie Olivares to me for evaluation  Below are my notes for this consultation  If you have questions, please do not hesitate to call me  I look forward to following your patient along with you           Sincerely,        Myranda Aguirre MD        CC: No Recipients

## 2019-01-25 NOTE — PROGRESS NOTES
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  His recent positive biopsy was in March 2016 when he 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  Four other sites were negative  Repeat biopsies in March 2017 showed dysplasia but no neoplasm  Currently the patient has no signs or symptoms of recurrent disease  No visible tumor was seen today on cystoscopy  Full report appears below  Procedures  MALE FLEXIBLE CYSTOSCOPY    Preoperative diagnosis:  History of bladder cancer    Postoperative diagnosis:  Radiation cystitis    Operation:  Flexible cystoscopy    Surgeon:  Shadi Aguillon MD,FACS    Anesthesia:  Xylocaine jelly    Procedure:  Patient was brought to the cystoscopy room and positively identified  The patient was prepped and draped in sterile fashion  Ten mL of 1% xylocaine jelly was instilled into the urethra  A penile clamp was applied  The flexible cystoscope was inserted  Findings are as follows:    Penile Urethra:  normal  Bulbar Urethra:  normal  Prostate:  Extensive radiation effect  Minimal regrowth of lateral lobes  Bladder:   Bladder Neck:  Normal  Ureteral orifices:   Normal  Mucosa:   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   Trabeculation:  mild   PVR:  Minimal  Other findings: The cystoscope was removed  The patient tolerated the procedure well    Following additional consultation to review the findings with the patient, he was discharged from the office in satisfactory condition  Impression:  Radiation cystitis  No visible recurrent tumor  The following portions of the patient's history were reviewed and updated as appropriate: allergies, current medications, past family history, past medical history, past social history, past surgical history and problem list     Review of Systems   Constitutional: Negative for activity change and fatigue  Respiratory: Negative for shortness of breath and wheezing  Cardiovascular: Negative for chest pain  Hypertension  Gastrointestinal: Negative for abdominal pain  Endocrine:        NIDDM  Genitourinary: Negative for difficulty urinating, dysuria, frequency, hematuria and urgency  Musculoskeletal: Negative for back pain and gait problem  Skin: Negative  Allergic/Immunologic: Negative  Neurological: Negative  Psychiatric/Behavioral: Negative  Objective:      Ht 5' 11" (1 803 m)   Wt 95 3 kg (210 lb)   BMI 29 29 kg/m²          Physical Exam   Constitutional: He is oriented to person, place, and time  He appears well-developed and well-nourished  HENT:   Head: Normocephalic and atraumatic  Eyes: EOM are normal    Neck: Normal range of motion  Pulmonary/Chest: Effort normal    Musculoskeletal: Normal range of motion  Neurological: He is alert and oriented to person, place, and time  Skin: Skin is warm and dry  Psychiatric: He has a normal mood and affect   His behavior is normal  Judgment and thought content normal

## 2019-01-25 NOTE — LETTER
January 25, 2019     [phone]    Patient: Yves Rodriguez   YOB: 1934   Date of Visit: 1/25/2019       Dear Dr Dorcas Rivers: Thank you for referring Theodore Victor to me for evaluation  Below are my notes for this consultation  If you have questions, please do not hesitate to call me  I look forward to following your patient along with you           Sincerely,        Stone Uribe MD        CC: No Recipients

## 2019-01-29 ENCOUNTER — TELEPHONE (OUTPATIENT)
Dept: UROLOGY | Facility: MEDICAL CENTER | Age: 84
End: 2019-01-29

## 2019-01-29 NOTE — TELEPHONE ENCOUNTER
----- Message from Drea Jimenez MD sent at 1/29/2019 12:49 PM EST -----  Normal urine cytology  Please  Inform pt  Thank you

## 2020-01-28 ENCOUNTER — PROCEDURE VISIT (OUTPATIENT)
Dept: UROLOGY | Facility: MEDICAL CENTER | Age: 85
End: 2020-01-28
Payer: COMMERCIAL

## 2020-01-28 VITALS
BODY MASS INDEX: 29.35 KG/M2 | WEIGHT: 205 LBS | SYSTOLIC BLOOD PRESSURE: 138 MMHG | HEIGHT: 70 IN | DIASTOLIC BLOOD PRESSURE: 64 MMHG | HEART RATE: 59 BPM

## 2020-01-28 DIAGNOSIS — Z85.51 HISTORY OF BLADDER CANCER: Primary | ICD-10-CM

## 2020-01-28 DIAGNOSIS — Z85.46 HISTORY OF PROSTATE CANCER: ICD-10-CM

## 2020-01-28 DIAGNOSIS — C67.2 MALIGNANT NEOPLASM OF LATERAL WALL OF BLADDER (HCC): ICD-10-CM

## 2020-01-28 DIAGNOSIS — Z71.89 OTHER SPECIFIED COUNSELING: ICD-10-CM

## 2020-01-28 LAB
SL AMB  POCT GLUCOSE, UA: NEGATIVE
SL AMB LEUKOCYTE ESTERASE,UA: NEGATIVE
SL AMB POCT BILIRUBIN,UA: NEGATIVE
SL AMB POCT BLOOD,UA: NEGATIVE
SL AMB POCT CLARITY,UA: CLEAR
SL AMB POCT COLOR,UA: YELLOW
SL AMB POCT KETONES,UA: NEGATIVE
SL AMB POCT NITRITE,UA: NEGATIVE
SL AMB POCT PH,UA: 6
SL AMB POCT SPECIFIC GRAVITY,UA: >=1.03
SL AMB POCT URINE PROTEIN: ABNORMAL
SL AMB POCT UROBILINOGEN: 1

## 2020-01-28 PROCEDURE — 99214 OFFICE O/P EST MOD 30 MIN: CPT | Performed by: UROLOGY

## 2020-01-28 PROCEDURE — 81003 URINALYSIS AUTO W/O SCOPE: CPT | Performed by: UROLOGY

## 2020-01-28 PROCEDURE — 88112 CYTOPATH CELL ENHANCE TECH: CPT | Performed by: PATHOLOGY

## 2020-01-28 PROCEDURE — 52000 CYSTOURETHROSCOPY: CPT | Performed by: UROLOGY

## 2020-01-28 RX ORDER — VALSARTAN 160 MG/1
TABLET ORAL
COMMUNITY
Start: 2020-01-20

## 2020-01-28 NOTE — LETTER
January 29, 2020     [address] [facility] [address]    Patient: Eveline Duckworth   YOB: 1934   Date of Visit: 1/28/2020       Dear Dr Moustapha Cuellar: Thank you for referring Brandi Mcintosh to me for evaluation  Below are my notes for this consultation  If you have questions, please do not hesitate to call me  I look forward to following your patient along with you  Sincerely,        Caleb Engle MD        CC: No Recipients  Caleb Engle MD  1/29/2020  8:52 AM  Incomplete  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)  -     POCT urine dip auto non-scope    History of prostate cancer  -     PSA, Total Screen; Future    Other orders  -     valsartan (DIOVAN) 160 mg tablet          Subjective:      Patient ID: Eveline Duckworth is a 80 y o  male  HPI  History of bladder cancer:  No signs or symptoms of recurrent neoplasm  The patient returns today for surveillance cystoscopy and cytology  Procedures  MALE FLEXIBLE CYSTOSCOPY    Preoperative diagnosis:  History of bladder cancer, radiation cystitis    Postoperative diagnosis:  Same, no visible evidence of recurrent neoplasm    Operation:  Flexible cystoscopy    Surgeon:  Rizwana Villegas MD,FACS    Anesthesia:  Xylocaine jelly    Procedure:  Patient was brought to the cystoscopy room and positively identified  The patient was prepped and draped in sterile fashion  Ten mL of 1% xylocaine jelly was instilled into the urethra  A penile clamp was applied  The flexible cystoscope was inserted    Findings are as follows:    Penile Urethra:  normal  Bulbar Urethra:  normal  Prostate:  Widely patent  Pale mucosa secondary radiation  Bladder:   Bladder Neck:  Normal  Ureteral orifices:   Normal  Mucosa:   Diffuse changes of radiation cystitis  Trabeculation:  mild   PVR:  Minimal  Other findings: The cystoscope was removed  The patient tolerated the procedure well  Following additional consultation to review the findings with the patient, he was discharged from the office in satisfactory condition  Impression:  No evidence of recurrent neoplasm      History of prostate cancer: The patient was diagnosed with Daniel 9 prostate cancer in 2003  Since treatment, he has had no signs or symptoms of recurrence  He has not had a PSA in several years  One has been ordered  He notes nocturia x 3  He denies other significant urinary symptoms  He denies gross hematuria, urinary tract infections or incontinence  He is taking neither medications nor supplements for his symptoms  AUA SYMPTOM SCORE      Most Recent Value   AUA SYMPTOM SCORE   How often have you had a sensation of not emptying your bladder completely after you finished urinating? 1   How often have you had to urinate again less than two hours after you finished urinating? 2   How often have you found you stopped and started again several times when you urinate? 1   How often have you found it difficult to postpone urination? 0   How often have you had a weak urinary stream?  0   How often have you had to push or strain to begin urination? 0   How many times did you most typically get up to urinate from the time you went to bed at night until the time you got up in the morning?   3   Quality of Life: If you were to spend the rest of your life with your urinary condition just the way it is now, how would you feel about that?  1   AUA SYMPTOM SCORE  7        The patient is accompanied by his wife for the consultation portion of the procedure  The following portions of the patient's history were reviewed and updated as appropriate: allergies, current medications, past family history, past medical history, past social history, past surgical history and problem list     Review of Systems   Constitutional: Negative for activity change and fatigue  Respiratory: Negative for shortness of breath and wheezing  Cardiovascular: Negative for chest pain  Hypertension  Gastrointestinal: Negative for abdominal pain  Endocrine:        Non-insulin dependent diabetes  Genitourinary: Negative for difficulty urinating, dysuria, frequency, hematuria and urgency  Musculoskeletal: Negative for back pain and gait problem  Skin: Negative  Allergic/Immunologic: Negative  Neurological: Negative  Psychiatric/Behavioral: Negative  Objective:      /64   Pulse 59   Ht 5' 10" (1 778 m)   Wt 93 kg (205 lb)   BMI 29 41 kg/m²           Physical Exam   Constitutional: He is oriented to person, place, and time  He appears well-developed and well-nourished  HENT:   Head: Normocephalic and atraumatic  Eyes: EOM are normal    Neck: Normal range of motion  Pulmonary/Chest: Effort normal    Genitourinary:   Genitourinary Comments: STAR not performed  Musculoskeletal: Normal range of motion  Neurological: He is alert and oriented to person, place, and time  Skin: Skin is warm and dry  Psychiatric: He has a normal mood and affect  His behavior is normal  Judgment and thought content normal          Eddye MD Francie  1/28/2020 11:29 AM  Sign at close encounter  Assessment/Plan:    No problem-specific Assessment & Plan notes found for this encounter  Diagnoses and all orders for this visit:    History of bladder cancer  -     Cytology, urine;  Future    Malignant neoplasm of lateral wall of urinary bladder (HCC)    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)  -     POCT urine dip auto non-scope    History of prostate cancer  -     PSA, Total Screen; Future    Other orders  -     valsartan (DIOVAN) 160 mg tablet          Subjective:      Patient ID: Flakito Gaytan is a 80 y o  male  HPI  History of bladder cancer:  No signs or symptoms of recurrent neoplasm  The patient returns today for surveillance cystoscopy and cytology  Procedures  MALE FLEXIBLE CYSTOSCOPY    Preoperative diagnosis:  History of bladder cancer, radiation cystitis    Postoperative diagnosis:  Same, no visible evidence of recurrent neoplasm    Operation:  Flexible cystoscopy    Surgeon:  Valentina Dickerson MD,FACS    Anesthesia:  Xylocaine jelly    Procedure:  Patient was brought to the cystoscopy room and positively identified  The patient was prepped and draped in sterile fashion  Ten mL of 1% xylocaine jelly was instilled into the urethra  A penile clamp was applied  The flexible cystoscope was inserted  Findings are as follows:    Penile Urethra:  normal  Bulbar Urethra:  normal  Prostate:  Widely patent  Pale mucosa secondary radiation  Bladder:   Bladder Neck:  Normal  Ureteral orifices:   Normal  Mucosa:   Diffuse changes of radiation cystitis  Trabeculation:  mild   PVR:  Minimal  Other findings: The cystoscope was removed  The patient tolerated the procedure well  Following additional consultation to review the findings with the patient, he was discharged from the office in satisfactory condition  Impression:  No evidence of recurrent neoplasm      History of prostate cancer: The patient was diagnosed with Daniel 9 prostate cancer in 2003  Since treatment, he has had no signs or symptoms of recurrence  He has not had a PSA in several years  One has been ordered  He notes nocturia x 3  He denies other significant urinary symptoms  He denies gross hematuria, urinary tract infections or incontinence  He is taking neither medications nor supplements for his symptoms  AUA SYMPTOM SCORE      Most Recent Value   AUA SYMPTOM SCORE   How often have you had a sensation of not emptying your bladder completely after you finished urinating? 1   How often have you had to urinate again less than two hours after you finished urinating? 2   How often have you found you stopped and started again several times when you urinate? 1   How often have you found it difficult to postpone urination? 0   How often have you had a weak urinary stream?  0   How often have you had to push or strain to begin urination? 0   How many times did you most typically get up to urinate from the time you went to bed at night until the time you got up in the morning? 3   Quality of Life: If you were to spend the rest of your life with your urinary condition just the way it is now, how would you feel about that?  1   AUA SYMPTOM SCORE  7        The patient is accompanied by his wife for the consultation portion of the procedure  The following portions of the patient's history were reviewed and updated as appropriate: allergies, current medications, past family history, past medical history, past social history, past surgical history and problem list     Review of Systems   Constitutional: Negative for activity change and fatigue  Respiratory: Negative for shortness of breath and wheezing  Cardiovascular: Negative for chest pain  Hypertension  Gastrointestinal: Negative for abdominal pain  Endocrine:        Non-insulin dependent diabetes  Genitourinary: Negative for difficulty urinating, dysuria, frequency, hematuria and urgency  Musculoskeletal: Negative for back pain and gait problem  Skin: Negative  Allergic/Immunologic: Negative  Neurological: Negative  Psychiatric/Behavioral: Negative              Objective:      /64   Pulse 59   Ht 5' 10" (1 778 m)   Wt 93 kg (205 lb)   BMI 29 41 kg/m²           Physical Exam   Constitutional: He is oriented to person, place, and time  He appears well-developed and well-nourished  HENT:   Head: Normocephalic and atraumatic  Eyes: EOM are normal    Neck: Normal range of motion  Pulmonary/Chest: Effort normal    Musculoskeletal: Normal range of motion  Neurological: He is alert and oriented to person, place, and time  Skin: Skin is warm and dry  Psychiatric: He has a normal mood and affect   His behavior is normal  Judgment and thought content normal

## 2020-01-28 NOTE — PROGRESS NOTES
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)  -     POCT urine dip auto non-scope    History of prostate cancer  -     PSA, Total Screen; Future    Other orders  -     valsartan (DIOVAN) 160 mg tablet          Subjective:      Patient ID: Galilea Francisco is a 80 y o  male  HPI  History of bladder cancer:  No signs or symptoms of recurrent neoplasm  The patient returns today for surveillance cystoscopy and cytology  Procedures  MALE FLEXIBLE CYSTOSCOPY    Preoperative diagnosis:  History of bladder cancer, radiation cystitis    Postoperative diagnosis:  Same, no visible evidence of recurrent neoplasm    Operation:  Flexible cystoscopy    Surgeon:  Marline Simon MD,FACS    Anesthesia:  Xylocaine jelly    Procedure:  Patient was brought to the cystoscopy room and positively identified  The patient was prepped and draped in sterile fashion  Ten mL of 1% xylocaine jelly was instilled into the urethra  A penile clamp was applied  The flexible cystoscope was inserted  Findings are as follows:    Penile Urethra:  normal  Bulbar Urethra:  normal  Prostate:  Widely patent  Pale mucosa secondary radiation  Bladder:   Bladder Neck:  Normal  Ureteral orifices:   Normal  Mucosa:   Diffuse changes of radiation cystitis  Trabeculation:  mild   PVR:  Minimal  Other findings: The cystoscope was removed  The patient tolerated the procedure well  Following additional consultation to review the findings with the patient, he was discharged from the office in satisfactory condition      Impression:  No evidence of recurrent neoplasm      History of prostate cancer: The patient was diagnosed with Catharpin 9 prostate cancer in 2003  Since treatment, he has had no signs or symptoms of recurrence  He has not had a PSA in several years  One has been ordered  He notes nocturia x 3  He denies other significant urinary symptoms  He denies gross hematuria, urinary tract infections or incontinence  He is taking neither medications nor supplements for his symptoms  AUA SYMPTOM SCORE      Most Recent Value   AUA SYMPTOM SCORE   How often have you had a sensation of not emptying your bladder completely after you finished urinating? 1   How often have you had to urinate again less than two hours after you finished urinating? 2   How often have you found you stopped and started again several times when you urinate? 1   How often have you found it difficult to postpone urination? 0   How often have you had a weak urinary stream?  0   How often have you had to push or strain to begin urination? 0   How many times did you most typically get up to urinate from the time you went to bed at night until the time you got up in the morning? 3   Quality of Life: If you were to spend the rest of your life with your urinary condition just the way it is now, how would you feel about that?  1   AUA SYMPTOM SCORE  7        The patient is accompanied by his wife for the consultation portion of the procedure  The following portions of the patient's history were reviewed and updated as appropriate: allergies, current medications, past family history, past medical history, past social history, past surgical history and problem list     Review of Systems   Constitutional: Negative for activity change and fatigue  Respiratory: Negative for shortness of breath and wheezing  Cardiovascular: Negative for chest pain  Hypertension  Gastrointestinal: Negative for abdominal pain  Endocrine:        Non-insulin dependent diabetes  Genitourinary: Negative for difficulty urinating, dysuria, frequency, hematuria and urgency  Musculoskeletal: Negative for back pain and gait problem  Skin: Negative  Allergic/Immunologic: Negative  Neurological: Negative  Psychiatric/Behavioral: Negative  Objective:      /64   Pulse 59   Ht 5' 10" (1 778 m)   Wt 93 kg (205 lb)   BMI 29 41 kg/m²          Physical Exam   Constitutional: He is oriented to person, place, and time  He appears well-developed and well-nourished  HENT:   Head: Normocephalic and atraumatic  Eyes: EOM are normal    Neck: Normal range of motion  Pulmonary/Chest: Effort normal    Genitourinary:   Genitourinary Comments: STAR not performed  Musculoskeletal: Normal range of motion  Neurological: He is alert and oriented to person, place, and time  Skin: Skin is warm and dry  Psychiatric: He has a normal mood and affect   His behavior is normal  Judgment and thought content normal

## 2020-01-29 PROBLEM — C67.2 MALIGNANT NEOPLASM OF LATERAL WALL OF URINARY BLADDER (HCC): Status: RESOLVED | Noted: 2019-01-25 | Resolved: 2020-01-29

## 2020-01-29 PROBLEM — Z85.51 HISTORY OF BLADDER CANCER: Status: ACTIVE | Noted: 2020-01-29

## 2020-01-29 PROBLEM — Z85.46 HISTORY OF PROSTATE CANCER: Status: ACTIVE | Noted: 2020-01-29

## 2020-01-31 ENCOUNTER — TELEPHONE (OUTPATIENT)
Dept: UROLOGY | Facility: CLINIC | Age: 85
End: 2020-01-31

## 2020-01-31 NOTE — TELEPHONE ENCOUNTER
----- Message from Marline Ferguson MD sent at 1/31/2020  7:38 AM EST -----  Regarding: Urine cytology  Please inform the patient that his urine cytology did not contain any cancer cells  Thank you

## 2020-01-31 NOTE — TELEPHONE ENCOUNTER
Spoke with patients wife, aware cytology showed no cancer cells  All questions answered at this time

## 2021-02-02 DIAGNOSIS — Z85.46 HISTORY OF PROSTATE CANCER: Primary | ICD-10-CM

## 2021-02-09 ENCOUNTER — PROCEDURE VISIT (OUTPATIENT)
Dept: UROLOGY | Facility: MEDICAL CENTER | Age: 86
End: 2021-02-09
Payer: COMMERCIAL

## 2021-02-09 ENCOUNTER — TELEPHONE (OUTPATIENT)
Dept: UROLOGY | Facility: MEDICAL CENTER | Age: 86
End: 2021-02-09

## 2021-02-09 VITALS
SYSTOLIC BLOOD PRESSURE: 124 MMHG | DIASTOLIC BLOOD PRESSURE: 80 MMHG | WEIGHT: 222 LBS | HEIGHT: 71 IN | BODY MASS INDEX: 31.08 KG/M2

## 2021-02-09 DIAGNOSIS — Z85.51 HISTORY OF BLADDER CANCER: Primary | ICD-10-CM

## 2021-02-09 DIAGNOSIS — Z85.46 HISTORY OF PROSTATE CANCER: ICD-10-CM

## 2021-02-09 PROCEDURE — 52000 CYSTOURETHROSCOPY: CPT | Performed by: UROLOGY

## 2021-02-09 PROCEDURE — 81003 URINALYSIS AUTO W/O SCOPE: CPT | Performed by: UROLOGY

## 2021-02-09 NOTE — LETTER
February 9, 2021     [facility] [place] [phone]    Patient: Joshua Neville   YOB: 1934   Date of Visit: 2/9/2021       Dear Dr Sena Session: Thank you for referring Ashley Leiva to me for evaluation  Below are my notes for this consultation  If you have questions, please do not hesitate to call me  I look forward to following your patient along with you  Sincerely,        Mary Krause MD        CC: No Recipients  Mary Krause MD  2/9/2021  1:59 PM  Incomplete  Assessment/Plan:    History of prostate cancer    No evidence of recurrent tumor since definitive treatment in 2003  Reassess in 1 year  History of bladder cancer    No visible recurrent bladder cancer  Repeat cystoscopy in 1 year  History of prostate cancer:  No evidence of recurrent neoplasm 18 years following definitive treatment  Diagnoses and all orders for this visit:    History of prostate cancer    History of bladder cancer          Subjective:      Patient ID: Joshua Neville is a 80 y o  male  HPI  History of bladder cancer: The patient has a long history of recurrent bladder cancer  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, the patient radiation therapy for [place] 9 prostate cancer  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  No signs or symptoms of recurrent neoplasm  The patient returns today for surveillance cystoscopy and cytology  Procedures  MALE FLEXIBLE CYSTOSCOPY    Preoperative diagnosis:  History of bladder cancer, radiation cystitis    Postoperative diagnosis:  Same, no visible evidence of recurrent neoplasm    Operation:  Flexible cystoscopy    Surgeon:  Renee Boxer, MD,FACS    Anesthesia:  Xylocaine jelly    Procedure:  Patient was brought to the cystoscopy room and positively identified    The patient was prepped and draped in sterile fashion  Ten mL of 1% xylocaine jelly was instilled into the urethra  A penile clamp was applied  The flexible cystoscope was inserted  Findings are as follows:    Penile Urethra:  normal  Bulbar Urethra:  normal  Prostate:  Widely patent  Pale mucosa secondary radiation with occasional telangiectasias  Bladder:   Bladder Neck:  Normal  Ureteral orifices:   Normal  Mucosa:   Diffuse changes of radiation cystitis  No visible tumor or areas suspicious for carcinoma in situ  Trabeculation:  mild   PVR:  Minimal  Other findings:   No visible recurrent tumor  The cystoscope was removed  The patient tolerated the procedure well  Following additional consultation to review the findings with the patient, he was discharged from the office in satisfactory condition  Impression:  No evidence of recurrent neoplasm      History of prostate cancer: The patient was diagnosed with Daniel 9 prostate cancer in 2003  Since treatment, he has had no signs or symptoms of recurrence  He has not had a PSA in several years  2021 PSA pending  He notes nocturia x 3  He denies other significant urinary symptoms  He denies gross hematuria, urinary tract infections or incontinence  He is taking neither medications nor supplements for his symptoms  AUA SYMPTOM SCORE      Most Recent Value   AUA SYMPTOM SCORE   How often have you had a sensation of not emptying your bladder completely after you finished urinating? 1   How often have you had to urinate again less than two hours after you finished urinating? 2   How often have you found you stopped and started again several times when you urinate? 1   How often have you found it difficult to postpone urination? 0   How often have you had a weak urinary stream?  0   How often have you had to push or strain to begin urination?   0   How many times did you most typically get up to urinate from the time you went to bed at night until the time you got up in the morning? 3   Quality of Life: If you were to spend the rest of your life with your urinary condition just the way it is now, how would you feel about that?  1   AUA SYMPTOM SCORE  7        The patient is accompanied by his wife for the consultation portion of the procedure  The following portions of the patient's history were reviewed and updated as appropriate: allergies, current medications, past family history, past medical history, past social history, past surgical history and problem list     Review of Systems   Constitutional: Negative for activity change and fatigue  Respiratory: Negative for shortness of breath and wheezing  Cardiovascular: Negative for chest pain  Hypertension  Gastrointestinal: Negative for abdominal pain  Endocrine:        Non-insulin dependent diabetes  Genitourinary: Negative for difficulty urinating, dysuria, frequency, hematuria and urgency  Musculoskeletal: Negative for back pain and gait problem  Skin: Negative  Allergic/Immunologic: Negative  Neurological: Negative  Psychiatric/Behavioral: Negative  Objective:      /80   Ht 5' 11" (1 803 m)   Wt 101 kg (222 lb)   BMI 30 96 kg/m²          Physical Exam  Constitutional:       Appearance: He is well-developed  HENT:      Head: Normocephalic and atraumatic  Neck:      Musculoskeletal: Normal range of motion  Pulmonary:      Effort: Pulmonary effort is normal    Genitourinary:     Comments: STAR not performed  Musculoskeletal: Normal range of motion  Skin:     General: Skin is warm and dry  Neurological:      Mental Status: He is alert and oriented to person, place, and time  Psychiatric:         Behavior: Behavior normal          Thought Content:  Thought content normal          Judgment: Judgment normal            Marguerite Glynn MD  2/9/2021 11:36 AM  Sign when Signing Visit  Assessment/Plan:    No problem-specific Assessment & Plan notes found for this encounter  History of prostate cancer:  No evidence of recurrent neoplasm 18 years following definitive treatment  There are no diagnoses linked to this encounter  Subjective:      Patient ID: Mario Oliveira is a 80 y o  male  HPI  History of bladder cancer:  No signs or symptoms of recurrent neoplasm  The patient returns today for surveillance cystoscopy and cytology  Procedures  MALE FLEXIBLE CYSTOSCOPY    Preoperative diagnosis:  History of bladder cancer, radiation cystitis    Postoperative diagnosis:  Same, no visible evidence of recurrent neoplasm    Operation:  Flexible cystoscopy    Surgeon:  Bertha Phoenix, MD,FACS    Anesthesia:  Xylocaine jelly    Procedure:  Patient was brought to the cystoscopy room and positively identified  The patient was prepped and draped in sterile fashion  Ten mL of 1% xylocaine jelly was instilled into the urethra  A penile clamp was applied  The flexible cystoscope was inserted  Findings are as follows:    Penile Urethra:  normal  Bulbar Urethra:  normal  Prostate:  Widely patent  Pale mucosa secondary radiation  Bladder:   Bladder Neck:  Normal  Ureteral orifices:   Normal  Mucosa:   Diffuse changes of radiation cystitis  Trabeculation:  mild   PVR:  Minimal  Other findings: The cystoscope was removed  The patient tolerated the procedure well  Following additional consultation to review the findings with the patient, he was discharged from the office in satisfactory condition  Impression:  No evidence of recurrent neoplasm      History of prostate cancer: The patient was diagnosed with Daniel 9 prostate cancer in 2003  Since treatment, he has had no signs or symptoms of recurrence  He has not had a PSA in several years  2021 PSA pending  He notes nocturia x 3  He denies other significant urinary symptoms  He denies gross hematuria, urinary tract infections or incontinence    He is taking neither medications nor supplements for his symptoms  AUA SYMPTOM SCORE      Most Recent Value   AUA SYMPTOM SCORE   How often have you had a sensation of not emptying your bladder completely after you finished urinating? 1   How often have you had to urinate again less than two hours after you finished urinating? 2   How often have you found you stopped and started again several times when you urinate? 1   How often have you found it difficult to postpone urination? 0   How often have you had a weak urinary stream?  0   How often have you had to push or strain to begin urination? 0   How many times did you most typically get up to urinate from the time you went to bed at night until the time you got up in the morning? 3   Quality of Life: If you were to spend the rest of your life with your urinary condition just the way it is now, how would you feel about that?  1   AUA SYMPTOM SCORE  7        The patient is accompanied by his wife for the consultation portion of the procedure  The following portions of the patient's history were reviewed and updated as appropriate: allergies, current medications, past family history, past medical history, past social history, past surgical history and problem list     Review of Systems   Constitutional: Negative for activity change and fatigue  Respiratory: Negative for shortness of breath and wheezing  Cardiovascular: Negative for chest pain  Hypertension  Gastrointestinal: Negative for abdominal pain  Endocrine:        Non-insulin dependent diabetes  Genitourinary: Negative for difficulty urinating, dysuria, frequency, hematuria and urgency  Musculoskeletal: Negative for back pain and gait problem  Skin: Negative  Allergic/Immunologic: Negative  Neurological: Negative  Psychiatric/Behavioral: Negative              Objective:      /80   Ht 5' 11" (1 803 m)   Wt 101 kg (222 lb)   BMI 30 96 kg/m²          Physical Exam  Constitutional: Appearance: He is well-developed  HENT:      Head: Normocephalic and atraumatic  Neck:      Musculoskeletal: Normal range of motion  Pulmonary:      Effort: Pulmonary effort is normal    Genitourinary:     Comments: STAR not performed  Musculoskeletal: Normal range of motion  Skin:     General: Skin is warm and dry  Neurological:      Mental Status: He is alert and oriented to person, place, and time  Psychiatric:         Behavior: Behavior normal          Thought Content:  Thought content normal          Judgment: Judgment normal

## 2021-02-09 NOTE — PROGRESS NOTES
Assessment/Plan:    History of prostate cancer    No evidence of recurrent tumor since definitive treatment in 2003  Reassess in 1 year  History of bladder cancer    No visible recurrent bladder cancer  Repeat cystoscopy in 1 year  History of prostate cancer:  No evidence of recurrent neoplasm 18 years following definitive treatment  Diagnoses and all orders for this visit:    History of prostate cancer    History of bladder cancer          Subjective:      Patient ID: Allie Mcguire is a 80 y o  male  HPI  History of bladder cancer: The patient has a long history of recurrent bladder cancer  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, the patient radiation therapy for [place] 9 prostate cancer  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  No signs or symptoms of recurrent neoplasm  The patient returns today for surveillance cystoscopy and cytology  Procedures  MALE FLEXIBLE CYSTOSCOPY    Preoperative diagnosis:  History of bladder cancer, radiation cystitis    Postoperative diagnosis:  Same, no visible evidence of recurrent neoplasm    Operation:  Flexible cystoscopy    Surgeon:  Caprice Royal MD,FACS    Anesthesia:  Xylocaine jelly    Procedure:  Patient was brought to the cystoscopy room and positively identified  The patient was prepped and draped in sterile fashion  Ten mL of 1% xylocaine jelly was instilled into the urethra  A penile clamp was applied  The flexible cystoscope was inserted  Findings are as follows:    Penile Urethra:  normal  Bulbar Urethra:  normal  Prostate:  Widely patent  Pale mucosa secondary radiation with occasional telangiectasias  Bladder:   Bladder Neck:  Normal  Ureteral orifices:   Normal  Mucosa:   Diffuse changes of radiation cystitis  No visible tumor or areas suspicious for carcinoma in situ    Trabeculation:  mild   PVR:  Minimal  Other findings:   No visible recurrent tumor  The cystoscope was removed  The patient tolerated the procedure well  Following additional consultation to review the findings with the patient, he was discharged from the office in satisfactory condition  Impression:  No evidence of recurrent neoplasm      History of prostate cancer: The patient was diagnosed with Daniel 9 prostate cancer in 2003  Since treatment, he has had no signs or symptoms of recurrence  He has not had a PSA in several years  2021 PSA pending  He notes nocturia x 3  He denies other significant urinary symptoms  He denies gross hematuria, urinary tract infections or incontinence  He is taking neither medications nor supplements for his symptoms  AUA SYMPTOM SCORE      Most Recent Value   AUA SYMPTOM SCORE   How often have you had a sensation of not emptying your bladder completely after you finished urinating? 1   How often have you had to urinate again less than two hours after you finished urinating? 2   How often have you found you stopped and started again several times when you urinate? 1   How often have you found it difficult to postpone urination? 0   How often have you had a weak urinary stream?  0   How often have you had to push or strain to begin urination? 0   How many times did you most typically get up to urinate from the time you went to bed at night until the time you got up in the morning? 3   Quality of Life: If you were to spend the rest of your life with your urinary condition just the way it is now, how would you feel about that?  1   AUA SYMPTOM SCORE  7        The patient is accompanied by his wife for the consultation portion of the procedure      The following portions of the patient's history were reviewed and updated as appropriate: allergies, current medications, past family history, past medical history, past social history, past surgical history and problem list     Review of Systems Constitutional: Negative for activity change and fatigue  Respiratory: Negative for shortness of breath and wheezing  Cardiovascular: Negative for chest pain  Hypertension  Gastrointestinal: Negative for abdominal pain  Endocrine:        Non-insulin dependent diabetes  Genitourinary: Negative for difficulty urinating, dysuria, frequency, hematuria and urgency  Musculoskeletal: Negative for back pain and gait problem  Skin: Negative  Allergic/Immunologic: Negative  Neurological: Negative  Psychiatric/Behavioral: Negative  Objective:      /80   Ht 5' 11" (1 803 m)   Wt 101 kg (222 lb)   BMI 30 96 kg/m²          Physical Exam  Constitutional:       Appearance: He is well-developed  HENT:      Head: Normocephalic and atraumatic  Neck:      Musculoskeletal: Normal range of motion  Pulmonary:      Effort: Pulmonary effort is normal    Genitourinary:     Comments: STAR not performed  Musculoskeletal: Normal range of motion  Skin:     General: Skin is warm and dry  Neurological:      Mental Status: He is alert and oriented to person, place, and time  Psychiatric:         Behavior: Behavior normal          Thought Content:  Thought content normal          Judgment: Judgment normal

## 2021-02-09 NOTE — TELEPHONE ENCOUNTER
Call placed to patient and [facility] for him to contact the office to review recommendations of Dr Kip Garcia at this time

## 2021-02-09 NOTE — TELEPHONE ENCOUNTER
----- Message from Janey Mednia MD sent at 2/9/2021  2:02 PM EST -----    The patient's urine sample was not properly labeled today in so we could not send it for cytology  I have placed an order for urine cytology  Please contact the patient and let him know that when he gets his PSA drawn, he should give them a urine sample also  Thank you

## 2021-10-14 ENCOUNTER — TELEPHONE (OUTPATIENT)
Dept: UROLOGY | Facility: MEDICAL CENTER | Age: 86
End: 2021-10-14

## 2021-10-15 ENCOUNTER — TELEPHONE (OUTPATIENT)
Dept: UROLOGY | Facility: MEDICAL CENTER | Age: 86
End: 2021-10-15

## 2021-10-15 DIAGNOSIS — R32 URINARY INCONTINENCE, UNSPECIFIED TYPE: Primary | ICD-10-CM

## 2021-10-28 ENCOUNTER — OFFICE VISIT (OUTPATIENT)
Dept: UROLOGY | Facility: MEDICAL CENTER | Age: 86
End: 2021-10-28
Payer: COMMERCIAL

## 2021-10-28 VITALS
DIASTOLIC BLOOD PRESSURE: 80 MMHG | WEIGHT: 222 LBS | HEART RATE: 78 BPM | BODY MASS INDEX: 31.08 KG/M2 | SYSTOLIC BLOOD PRESSURE: 130 MMHG | HEIGHT: 71 IN

## 2021-10-28 DIAGNOSIS — R35.0 FREQUENCY OF URINATION: ICD-10-CM

## 2021-10-28 DIAGNOSIS — Z85.46 HISTORY OF PROSTATE CANCER: Primary | ICD-10-CM

## 2021-10-28 DIAGNOSIS — R32 URINARY INCONTINENCE, UNSPECIFIED TYPE: ICD-10-CM

## 2021-10-28 LAB
SL AMB  POCT GLUCOSE, UA: ABNORMAL
SL AMB LEUKOCYTE ESTERASE,UA: ABNORMAL
SL AMB POCT BILIRUBIN,UA: ABNORMAL
SL AMB POCT BLOOD,UA: ABNORMAL
SL AMB POCT CLARITY,UA: CLEAR
SL AMB POCT COLOR,UA: YELLOW
SL AMB POCT KETONES,UA: ABNORMAL
SL AMB POCT NITRITE,UA: ABNORMAL
SL AMB POCT PH,UA: 5
SL AMB POCT SPECIFIC GRAVITY,UA: 1.02
SL AMB POCT URINE PROTEIN: ABNORMAL
SL AMB POCT UROBILINOGEN: 0.2

## 2021-10-28 PROCEDURE — 99214 OFFICE O/P EST MOD 30 MIN: CPT | Performed by: UROLOGY

## 2021-10-28 PROCEDURE — 81003 URINALYSIS AUTO W/O SCOPE: CPT | Performed by: UROLOGY

## 2021-10-28 PROCEDURE — 88112 CYTOPATH CELL ENHANCE TECH: CPT | Performed by: PATHOLOGY

## 2021-10-28 RX ORDER — OXYBUTYNIN CHLORIDE 5 MG/1
5 TABLET ORAL 3 TIMES DAILY PRN
Qty: 90 TABLET | Refills: 3 | Status: SHIPPED | OUTPATIENT
Start: 2021-10-28 | End: 2022-03-31

## 2022-01-31 ENCOUNTER — HOSPITAL ENCOUNTER (EMERGENCY)
Facility: HOSPITAL | Age: 87
Discharge: HOME | End: 2022-01-31
Attending: EMERGENCY MEDICINE | Admitting: EMERGENCY MEDICINE
Payer: COMMERCIAL

## 2022-01-31 VITALS
DIASTOLIC BLOOD PRESSURE: 74 MMHG | WEIGHT: 224 LBS | OXYGEN SATURATION: 96 % | RESPIRATION RATE: 16 BRPM | SYSTOLIC BLOOD PRESSURE: 167 MMHG | HEIGHT: 70 IN | HEART RATE: 71 BPM | TEMPERATURE: 97.6 F | BODY MASS INDEX: 32.07 KG/M2

## 2022-01-31 DIAGNOSIS — R21 RASH: Primary | ICD-10-CM

## 2022-01-31 PROCEDURE — 99283 EMERGENCY DEPT VISIT LOW MDM: CPT

## 2022-01-31 PROCEDURE — 99284 EMERGENCY DEPT VISIT MOD MDM: CPT | Performed by: PHYSICIAN ASSISTANT

## 2022-01-31 RX ORDER — FEXOFENADINE HYDROCHLORIDE 60 MG/1
60 TABLET, FILM COATED ORAL 2 TIMES DAILY PRN
Qty: 20 TABLET | Refills: 0 | Status: SHIPPED | OUTPATIENT
Start: 2022-01-31

## 2022-01-31 RX ORDER — CEPHALEXIN 500 MG/1
500 CAPSULE ORAL EVERY 8 HOURS SCHEDULED
Qty: 21 CAPSULE | Refills: 0 | Status: SHIPPED | OUTPATIENT
Start: 2022-01-31 | End: 2022-02-07

## 2022-01-31 NOTE — DISCHARGE INSTRUCTIONS
Please refer to the attached information for strict return instructions  If symptoms worsen or new symptoms develop please return to the ER  Please follow up with dermatology if symptoms persist  Use prescribed medications as instructed

## 2022-01-31 NOTE — ED PROVIDER NOTES
History  Chief Complaint   Patient presents with    Rash     arrived via EMS  pt reports rash b/l legs and back for 2 weeks that is not getting better  St ramos is an 79 yo M, history of DM2, previous history of bladder cancer, presenting with rash to b/l lower extremities and back over the past 1-2 weeks  He notes the rash began on his foot and now involves b/l lower legs, thighs, and low back  He reports the rash is pruritic, notes minimal "stinging" sensation to regions he has frequently scratched at  Denies drainage/discharge  Denies fevers/chills  No previous history of similar  No known exposure to new allergens, medications  Reports he tried switching his soap to sensitive soap without improvement  He reportedly saw his PCP twice for this issue, and was recently prescribed a topical corticosteroid which he has been taking without relief  History provided by:  Patient   used: No        Prior to Admission Medications   Prescriptions Last Dose Informant Patient Reported? Taking? B Complex Vitamins (B COMPLEX 1 PO)  Self Yes No   Sig: Take 1 tablet by mouth   Calcium Carb-Ergocalciferol 250-125 MG-UNIT TABS  Self Yes No   Sig: Take 1 tablet by mouth daily   FLUOXETINE HCL PO  Self Yes No   Sig: Take 20 mg by mouth   Patient not taking: Reported on 10/28/2021   FLUoxetine (PROzac) 40 MG capsule  Self Yes No   Sig: daily   aspirin 81 MG tablet  Self Yes No   Sig: Take 81 mg by mouth daily     atorvastatin (LIPITOR) 40 mg tablet  Self Yes No   Sig: Take 40 mg by mouth   carvedilol (COREG) 12 5 mg tablet  Self Yes No   Sig: Take 12 5 mg by mouth   cyanocobalamin (VITAMIN B-12) 100 mcg tablet  Self Yes No   Sig: Take by mouth   losartan-hydrochlorothiazide (HYZAAR) 100-25 MG per tablet  Self Yes No   Sig: Take by mouth daily   metFORMIN (GLUCOPHAGE-XR) 500 mg 24 hr tablet  Self Yes No   Sig: daily   metFORMIN (GLUMETZA) 1000 MG (MOD) 24 hr tablet  Self Yes No   Sig: Take 1,000 mg by mouth   ondansetron (ZOFRAN-ODT) 4 mg disintegrating tablet   No No   Sig: Take 1 tablet (4 mg total) by mouth every 8 (eight) hours as needed for nausea or vomiting for up to 7 days   Patient not taking: Reported on 10/28/2021   oxybutynin (DITROPAN) 5 mg tablet   No No   Sig: Take 1 tablet (5 mg total) by mouth 3 (three) times a day as needed (urinary frequency and urgency)   sitaGLIPtin (JANUVIA) 25 mg tablet  Self Yes No   Sig: Take 50 mg by mouth    valsartan (DIOVAN) 160 mg tablet   Yes No      Facility-Administered Medications: None       Past Medical History:   Diagnosis Date    Bladder trabeculation     Diabetes mellitus ([facility] [address] [facility]     Hyperlipidemia     Hypertension     Irradiation cystitis     Malignant neoplasm of dome of bladder ([facility]     Malignant neoplasm of lateral wall of bladder ([facility] [address] [facility]     Malignant neoplasm of overlapping sites of bladder ([facility] [address] [facility]     Malignant neoplasm prostate ([facility] [address] [facility]     Nocturia     Stroke ([facility] [address] [facility]     Type 2 diabetes mellitus ([facility] [address] )        Past Surgical History:   Procedure Laterality Date    APPENDECTOMY      BLADDER FULGURATION      < 0 5 cm    CATARACT EXTRACTION, BILATERAL Bilateral     CYSTOSCOPY  , , , 2014, , , 2017    CYSTOSCOPY W/ RETROGRADES Bilateral 2015    CYSTOSTOMY W/ BLADDER BIOPSY  ,     GASTRECTOMY      GASTRIC BYPASS      for obesity   TRANSURETHRAL RESECTION OF BLADDER TUMOR  , ,        Family History   Problem Relation Age of Onset    Heart attack Daughter      I have reviewed and agree with the history as documented  E-Cigarette/Vaping     E-Cigarette/Vaping Substances     Social History     Tobacco Use    Smoking status: Former Smoker     Types: Cigarettes     Quit date:      Years since quittin 0    Smokeless tobacco: Never Used   Substance Use Topics    Alcohol use: Yes     Comment: whiskey    Drug use: No       Review of Systems   Constitutional: Negative for chills and fever  HENT: Negative for congestion, rhinorrhea and sore throat  Eyes: Negative for pain and visual disturbance  Respiratory: Negative for cough, shortness of breath and wheezing  Cardiovascular: Negative for chest pain and palpitations  Gastrointestinal: Negative for abdominal pain, nausea and vomiting  Genitourinary: Negative for dysuria, frequency and urgency  Musculoskeletal: Negative for back pain, neck pain and neck stiffness  Skin: Positive for rash  Negative for wound  Neurological: Negative for dizziness, weakness, light-headedness and numbness  Physical Exam  Physical Exam  Constitutional:       General: He is not in acute distress  Appearance: He is well-developed  He is not diaphoretic  HENT:      Head: Normocephalic and atraumatic  Right Ear: External ear normal       Left Ear: External ear normal    Eyes:      Conjunctiva/sclera: Conjunctivae normal       Pupils: Pupils are equal, round, and reactive to light  Cardiovascular:      Rate and Rhythm: Normal rate and regular rhythm  Heart sounds: Normal heart sounds  No murmur heard  No friction rub  No gallop  Pulmonary:      Effort: Pulmonary effort is normal  No respiratory distress  Breath sounds: Normal breath sounds  No wheezing  Abdominal:      General: There is no distension  Palpations: Abdomen is soft  Tenderness: There is no abdominal tenderness  Musculoskeletal:      Cervical back: Normal range of motion and neck supple  Lymphadenopathy:      Cervical: No cervical adenopathy  Skin:     General: Skin is warm and dry  Capillary Refill: Capillary refill takes less than 2 seconds  Findings: Rash present  No erythema  Comments: Scattered, eczematous appearing rash to bilateral lower legs, scattered to dorsum of feet, back  Occasional wheals noted intermittently to back  Neurological:      Mental Status: He is alert and oriented to person, place, and time        Motor: No abnormal muscle tone  Coordination: Coordination normal    Psychiatric:         Behavior: Behavior normal          Thought Content: Thought content normal          Judgment: Judgment normal              Vital Signs  ED Triage Vitals [01/31/22 1137]   Temperature Pulse Respirations Blood Pressure SpO2   97 6 °F (36 4 °C) 71 16 167/74 96 %      Temp Source Heart Rate Source Patient Position - Orthostatic VS BP Location FiO2 (%)   Oral Monitor Sitting Right arm --      Pain Score       No Pain           Vitals:    01/31/22 1137   BP: 167/74   Pulse: 71   Patient Position - Orthostatic VS: Sitting         Visual Acuity      ED Medications  Medications - No data to display    Diagnostic Studies  Results Reviewed     None                 No orders to display              Procedures  Procedures         ED Course  ED Course as of 01/31/22 1442   Mon Jan 31, 2022   1317 Case discussed with Dr Scott Watson, on call dermatology  Recommends providing with triamcinolone 0 1% ointment BID-QID or betamethasone valerate 0 1% ointment BID  Also recommends oral cephalexin  To follow up with derm if no improvement within 1-2 weeks  SBIRT 20yo+      Most Recent Value   SBIRT (22 yo +)    In order to provide better care to our patients, we are screening all of our patients for alcohol and drug use  Would it be okay to ask you these screening questions? Yes Filed at: 01/31/2022 1145   Initial Alcohol Screen: US AUDIT-C     1  How often do you have a drink containing alcohol? 0 Filed at: 01/31/2022 1145   2  How many drinks containing alcohol do you have on a typical day you are drinking? 0 Filed at: 01/31/2022 1145   3b  FEMALE Any Age, or MALE 65+: How often do you have 4 or more drinks on one occassion? 0 Filed at: 01/31/2022 1145   Audit-C Score 0 Filed at: 01/31/2022 1145   CHRISTIE: How many times in the past year have you        Used an illegal drug or used a prescription medication for non-medical reasons? Never Filed at: 01/31/2022 1145                    [facility]  Number of Diagnoses or Management Options  Rash  Diagnosis management comments: Rash to bilateral lower extremities, back over the past 1-2 weeks  Rash is eczematous, irregular, and with mild associated erythema  Low suspicion for infection/cellulitis  Rash discussed with dermatology as outlined in ED course  Possibly representing asteatotic dermatitis possibly with secondary impetiginization per derm  Provided with topical steroid ointment, antihistamine, keflex per recs  Patient reports he has follow up scheduled with dermatology, advised to maintain  Return to ED indications reviewed  Patient Progress  Patient progress: stable      Disposition  Final diagnoses:   Rash     Time reflects when diagnosis was documented in both MDM as applicable and the Disposition within this note     Time User Action Codes Description Comment    1/31/2022  1:17 PM Carson Danger Add [R21] Rash       ED Disposition     ED Disposition Condition Date/Time Comment    Discharge Stable Mon Jan 31, 2022  1:17 PM Se Soriano discharge to home/self care  Follow-up Information     Follow up With Specialties Details Why Contact Info Additional [address] [facility] Emergency Department Emergency Medicine  If symptoms worsen [facility] [phone]  [facility] Emergency Department, [address] [place] [address], [address] [facility] Dermatology  Please follow up with dermatology within one week if no improvement in symptoms   [address] [phone]  [phone] [address], [address] [facility]          Discharge Medication List as of 1/31/2022  1:22 PM      START taking these medications    Details   cephalexin (KEFLEX) 500 mg capsule Take 1 capsule (500 mg total) by mouth every 8 (eight) hours for 7 days, Starting Mon 1/31/2022, Until Mon 2/7/2022, Normal      fexofenadine (ALLEGRA) 60 MG tablet Take 1 tablet (60 mg total) by mouth 2 (two) times a day as needed (Itchiness, rash), Starting Mon 1/31/2022, Normal      triamcinolone (KENALOG) 0 1 % ointment Apply to rash up to four times daily as needed for itchiness , Normal         CONTINUE these medications which have NOT CHANGED    Details   aspirin 81 MG tablet Take 81 mg by mouth daily  , Historical Med      atorvastatin (LIPITOR) 40 mg tablet Take 40 mg by mouth, Starting Wed 2/3/2016, Historical Med      B Complex Vitamins (B COMPLEX 1 PO) Take 1 tablet by mouth, Historical Med      Calcium Carb-Ergocalciferol 250-125 MG-UNIT TABS Take 1 tablet by mouth daily, Historical Med      carvedilol (COREG) 12 5 mg tablet Take 12 5 mg by mouth, Starting Wed 2/3/2016, Historical Med      cyanocobalamin (VITAMIN B-12) 100 mcg tablet Take by mouth, Historical Med      !!  FLUoxetine (PROzac) 40 MG capsule daily, Starting Mon 3/5/2018, Historical Med      !! FLUOXETINE HCL PO Take 20 mg by mouth, Historical Med      losartan-hydrochlorothiazide (HYZAAR) 100-25 MG per tablet Take by mouth daily, Historical Med      metFORMIN (GLUCOPHAGE-XR) 500 mg 24 hr tablet daily, Starting Tue 2/13/2018, Historical Med      metFORMIN (GLUMETZA) 1000 MG (MOD) 24 hr tablet Take 1,000 mg by mouth, Historical Med      ondansetron (ZOFRAN-ODT) 4 mg disintegrating tablet Take 1 tablet (4 mg total) by mouth every 8 (eight) hours as needed for nausea or vomiting for up to 7 days, Starting Sat 9/22/2018, Until Sat 9/29/2018, Print      oxybutynin (DITROPAN) 5 mg tablet Take 1 tablet (5 mg total) by mouth 3 (three) times a day as needed (urinary frequency and urgency), Starting Thu 10/28/2021, Normal      sitaGLIPtin (JANUVIA) 25 mg tablet Take 50 mg by mouth , Historical Med      valsartan (DIOVAN) 160 mg tablet Starting Mon 1/20/2020, Historical Med       !! - Potential duplicate medications found  Please discuss with provider  No discharge procedures on file      PDMP Review     None          ED Provider  Electronically Signed by           Odalys Frazier PA-C  01/31/22 3687

## 2022-02-09 ENCOUNTER — TELEPHONE (OUTPATIENT)
Dept: UROLOGY | Facility: AMBULATORY SURGERY CENTER | Age: 87
End: 2022-02-09

## 2022-02-09 NOTE — TELEPHONE ENCOUNTER
Pt called in left a vm cannot keep Tuesdays appt  Pt was booked for a 1 year cysto w/ Chu  Please call him back and get him r/s with the doctor is taking over Ramona Borden 435 pts  Thank you!

## 2022-02-14 ENCOUNTER — TELEPHONE (OUTPATIENT)
Dept: UROLOGY | Facility: MEDICAL CENTER | Age: 87
End: 2022-02-14

## 2022-03-30 RX ORDER — LOSARTAN POTASSIUM 50 MG/1
TABLET ORAL
COMMUNITY
Start: 2021-12-21

## 2022-03-30 RX ORDER — HYDROXYZINE HYDROCHLORIDE 25 MG/1
TABLET, FILM COATED ORAL
COMMUNITY
Start: 2021-12-30

## 2022-03-31 ENCOUNTER — PROCEDURE VISIT (OUTPATIENT)
Dept: UROLOGY | Facility: MEDICAL CENTER | Age: 87
End: 2022-03-31
Payer: COMMERCIAL

## 2022-03-31 VITALS
WEIGHT: 202 LBS | DIASTOLIC BLOOD PRESSURE: 76 MMHG | SYSTOLIC BLOOD PRESSURE: 138 MMHG | HEART RATE: 114 BPM | HEIGHT: 70 IN | BODY MASS INDEX: 28.92 KG/M2

## 2022-03-31 DIAGNOSIS — Z85.46 HISTORY OF PROSTATE CANCER: ICD-10-CM

## 2022-03-31 DIAGNOSIS — Z85.51 HISTORY OF BLADDER CANCER: Primary | ICD-10-CM

## 2022-03-31 DIAGNOSIS — R35.0 FREQUENCY OF URINATION: ICD-10-CM

## 2022-03-31 PROCEDURE — 99214 OFFICE O/P EST MOD 30 MIN: CPT | Performed by: UROLOGY

## 2022-03-31 PROCEDURE — 52000 CYSTOURETHROSCOPY: CPT | Performed by: UROLOGY

## 2022-03-31 RX ORDER — SULFAMETHOXAZOLE AND TRIMETHOPRIM 800; 160 MG/1; MG/1
1 TABLET ORAL EVERY 12 HOURS SCHEDULED
Qty: 4 TABLET | Refills: 0 | Status: SHIPPED | OUTPATIENT
Start: 2022-03-31 | End: 2022-04-02

## 2022-03-31 RX ORDER — FLUOXETINE HYDROCHLORIDE 60 MG/1
TABLET, FILM COATED ORAL; ORAL
COMMUNITY
Start: 2022-02-23

## 2022-03-31 NOTE — PROGRESS NOTES
Assessment/Plan:  1  History of bladder cancer-cystoscopy negative for malignancy  Repeat cystoscopy and urinary cytology 1 year  2  History of prostate cancer with history of radiation therapy-radiation changes of the bladder on cystourethroscopy  Repeat PSA 1 year  3  BPH with lower urinary tract symptoms Frequency and urgency/enlargement of the lateral lobes of the prostate with visual occlusion of the bladder outlet  Would consider alpha blockade however the patient notes that he gets orthostatic quite often  If symptoms become worse or retention occurs patient will be considered for TURP with at the present time he is satisfied with continued watchful waiting    4  History of radiation therapy   No problem-specific Assessment & Plan notes found for this encounter  Diagnoses and all orders for this visit:    History of bladder cancer  -     sulfamethoxazole-trimethoprim (BACTRIM DS) 800-160 mg per tablet; Take 1 tablet by mouth every 12 (twelve) hours for 2 days  -     Cytology, urine; Future  -     Comprehensive metabolic panel; Future  -     Cystoscopy; Future    History of prostate cancer  -     PSA Total, Diagnostic; Future    Frequency of urination    Other orders  -     losartan (COZAAR) 50 mg tablet  -     hydrOXYzine HCL (ATARAX) 25 mg tablet  -     FLUoxetine (PROzac) 60 MG TABS;  (Patient not taking: Reported on 3/31/2022 )          Subjective:      Patient ID: Shantanu Williamson is a 80 y o  male  HPI  41-year-old male with a history of [place] pattern score 9 prostate cancer radiated in 2003 with a low PSA ever since presents for surveillance cystoscopy because of a past history of low-grade low stage urinary bladder malignancy  Patient also notes urinary frequency weakening of his urinary stream and nighttime urination with an AUA symptom score of 17    The patient presents for follow-up  The following portions of the patient's history were reviewed and updated as appropriate: allergies, current medications, past family history, past medical history, past social history, past surgical history and problem list     Review of Systems   Constitutional: Positive for fatigue  Gastrointestinal: Positive for constipation  Genitourinary: Positive for urgency  All other systems reviewed and are negative  Objective:      /76   Pulse (!) 114   Ht 5' 10" (1 778 m)   Wt 91 6 kg (202 lb)   BMI 28 98 kg/m²          Physical Exam  Vitals reviewed  Constitutional:       General: He is not in acute distress  Appearance: Normal appearance  He is not ill-appearing, toxic-appearing or diaphoretic  HENT:      Head: Normocephalic and atraumatic  Nose: Nose normal       Mouth/Throat:      Mouth: Mucous membranes are moist    Eyes:      Extraocular Movements: Extraocular movements intact  Pulmonary:      Effort: Pulmonary effort is normal  No respiratory distress  Abdominal:      General: Bowel sounds are normal  There is no distension  Palpations: Abdomen is soft  Genitourinary:     Penis: Normal     Neurological:      Mental Status: He is alert and oriented to person, place, and time  Psychiatric:         Mood and Affect: Mood normal          Behavior: Behavior normal          Thought Content: Thought content normal          Judgment: Judgment normal               Cystoscopy     Date/Time 3/31/2022 4:23 PM     Performed by  Nilda Mcginnis MD     Authorized by Nilda Mcginnis MD      Universal Protocol:  Consent: Verbal consent obtained  Written consent obtained    Risks and benefits: risks, benefits and alternatives were discussed  Consent given by: patient  Patient understanding: patient states understanding of the procedure being performed  Patient consent: the patient's understanding of the procedure matches consent given  Procedure consent: procedure consent matches procedure scheduled  Required items: required blood products, implants, devices, and special equipment available  Patient identity confirmed: verbally with patient        Procedure Details:  Procedure type: cystoscopy    Patient tolerance: Patient tolerated the procedure well with no immediate complications    Additional Procedure Details: The patient in the supine position after prepping his urethral meatus with Betadine and instilling lidocaine 2% lubricant within the urethral lumen flexible video cystourethroscopy took place which revealed a normal anterior urethra  The prostatic urethra revealed bilobar enlargement the lateral lobes of the prostate with visual occlusion of the bladder outlet  The bladder neck was mildly fibrotic but not obstructed and it almost appears as if no attempt at TURP was made at the level of the proximal prostatic urethra only  Urinary bladder was free of any intrinsic lesions moderately trabeculated and free of any extrinsic mass compression  Both ureteral orifices are in normal position and configuration bilaterally with clear efflux bilaterally  Retroflexion revealed no abnormalities at the bladder neck  The cystoscope was removed atraumatically and the patient recovered uneventfully

## 2022-03-31 NOTE — LETTER
March 31, 2022     [address] [place] [phone]    Patient: Summer Last   YOB: 1934   Date of Visit: 3/31/2022       Dear Dr Riley Knight: Thank you for referring Swati Pratt to me for evaluation  Below are my notes for this consultation  If you have questions, please do not hesitate to call me  I look forward to following your patient along with you  Sincerely,        Mary Newman MD        CC: No Recipients  Mary Newman MD  3/31/2022  4:25 PM  Sign when Signing Visit  Assessment/Plan:  1  History of bladder cancer-cystoscopy negative for malignancy  Repeat cystoscopy and urinary cytology 1 year  2  History of prostate cancer with history of radiation therapy-radiation changes of the bladder on cystourethroscopy  Repeat PSA 1 year  3  BPH with lower urinary tract symptoms Frequency and urgency/enlargement of the lateral lobes of the prostate with visual occlusion of the bladder outlet  Would consider alpha blockade however the patient notes that he gets orthostatic quite often  If symptoms become worse or retention occurs patient will be considered for TURP with at the present time he is satisfied with continued watchful waiting    4  History of radiation therapy   No problem-specific Assessment & Plan notes found for this encounter  Diagnoses and all orders for this visit:    History of bladder cancer  -     sulfamethoxazole-trimethoprim (BACTRIM DS) 800-160 mg per tablet; Take 1 tablet by mouth every 12 (twelve) hours for 2 days  -     Cytology, urine; Future  -     Comprehensive metabolic panel; Future  -     Cystoscopy; Future    History of prostate cancer  -     PSA Total, Diagnostic;  Future    Frequency of urination    Other orders  -     losartan (COZAAR) 50 mg tablet  -     hydrOXYzine HCL (ATARAX) 25 mg tablet  -     FLUoxetine (PROzac) 60 MG TABS;  (Patient not taking: Reported on 3/31/2022 ) Subjective:      Patient ID: Kylie Ferguson is a 80 y o  male  HPI  80-year-old male with a history of [place] pattern score 9 prostate cancer radiated in 2003 with a low PSA ever since presents for surveillance cystoscopy because of a past history of low-grade low stage urinary bladder malignancy  Patient also notes urinary frequency weakening of his urinary stream and nighttime urination with an AUA symptom score of 17  The patient presents for follow-up  The following portions of the patient's history were reviewed and updated as appropriate: allergies, current medications, past family history, past medical history, past social history, past surgical history and problem list     Review of Systems   Constitutional: Positive for fatigue  Gastrointestinal: Positive for constipation  Genitourinary: Positive for urgency  All other systems reviewed and are negative  Objective:      /76   Pulse (!) 114   Ht 5' 10" (1 778 m)   Wt 91 6 kg (202 lb)   BMI 28 98 kg/m²          Physical Exam  Vitals reviewed  Constitutional:       General: He is not in acute distress  Appearance: Normal appearance  He is not ill-appearing, toxic-appearing or diaphoretic  HENT:      Head: Normocephalic and atraumatic  Nose: Nose normal       Mouth/Throat:      Mouth: Mucous membranes are moist    Eyes:      Extraocular Movements: Extraocular movements intact  Pulmonary:      Effort: Pulmonary effort is normal  No respiratory distress  Abdominal:      General: Bowel sounds are normal  There is no distension  Palpations: Abdomen is soft  Genitourinary:     Penis: Normal     Neurological:      Mental Status: He is alert and oriented to person, place, and time  Psychiatric:         Mood and Affect: Mood normal          Behavior: Behavior normal          Thought Content:  Thought content normal          Judgment: Judgment normal               Cystoscopy     Date/Time 3/31/2022 4:23 PM Performed by  Anthony Nelson MD     Authorized by Anthony Nelson MD      Universal Protocol:  Consent: Verbal consent obtained  Written consent obtained  Risks and benefits: risks, benefits and alternatives were discussed  Consent given by: patient  Patient understanding: patient states understanding of the procedure being performed  Patient consent: the patient's understanding of the procedure matches consent given  Procedure consent: procedure consent matches procedure scheduled  Required items: required blood products, implants, devices, and special equipment available  Patient identity confirmed: verbally with patient        Procedure Details:  Procedure type: cystoscopy    Patient tolerance: Patient tolerated the procedure well with no immediate complications    Additional Procedure Details: The patient in the supine position after prepping his urethral meatus with Betadine and instilling lidocaine 2% lubricant within the urethral lumen flexible video cystourethroscopy took place which revealed a normal anterior urethra  The prostatic urethra revealed bilobar enlargement the lateral lobes of the prostate with visual occlusion of the bladder outlet  The bladder neck was mildly fibrotic but not obstructed and it almost appears as if no attempt at TURP was made at the level of the proximal prostatic urethra only  Urinary bladder was free of any intrinsic lesions moderately trabeculated and free of any extrinsic mass compression  Both ureteral orifices are in normal position and configuration bilaterally with clear efflux bilaterally  Retroflexion revealed no abnormalities at the bladder neck  The cystoscope was removed atraumatically and the patient recovered uneventfully

## 2022-06-06 ENCOUNTER — APPOINTMENT (EMERGENCY)
Dept: CT IMAGING | Facility: HOSPITAL | Age: 87
End: 2022-06-06
Payer: COMMERCIAL

## 2022-06-06 ENCOUNTER — HOSPITAL ENCOUNTER (EMERGENCY)
Facility: HOSPITAL | Age: 87
Discharge: HOME | End: 2022-06-06
Attending: EMERGENCY MEDICINE | Admitting: EMERGENCY MEDICINE
Payer: COMMERCIAL

## 2022-06-06 VITALS
SYSTOLIC BLOOD PRESSURE: 158 MMHG | RESPIRATION RATE: 22 BRPM | HEART RATE: 56 BPM | DIASTOLIC BLOOD PRESSURE: 73 MMHG | TEMPERATURE: 98.1 F | OXYGEN SATURATION: 94 %

## 2022-06-06 DIAGNOSIS — R42 LIGHTHEADEDNESS: Primary | ICD-10-CM

## 2022-06-06 LAB
2HR DELTA HS TROPONIN: -11 NG/L
ALBUMIN SERPL BCP-MCNC: 3.4 G/DL (ref 3.5–5)
ALP SERPL-CCNC: 52 U/L (ref 46–116)
ALT SERPL W P-5'-P-CCNC: 26 U/L (ref 12–78)
ANION GAP SERPL CALCULATED.3IONS-SCNC: 6 MMOL/L (ref 4–13)
AST SERPL W P-5'-P-CCNC: 19 U/L (ref 5–45)
ATRIAL RATE: 58 BPM
BASOPHILS # BLD AUTO: 0.02 THOUSANDS/ΜL (ref 0–0.1)
BASOPHILS NFR BLD AUTO: 0 % (ref 0–1)
BILIRUB SERPL-MCNC: 0.66 MG/DL (ref 0.2–1)
BUN SERPL-MCNC: 15 MG/DL (ref 5–25)
CALCIUM ALBUM COR SERPL-MCNC: 9.1 MG/DL (ref 8.3–10.1)
CALCIUM SERPL-MCNC: 8.6 MG/DL (ref 8.3–10.1)
CARDIAC TROPONIN I PNL SERPL HS: 73 NG/L
CARDIAC TROPONIN I PNL SERPL HS: 84 NG/L
CHLORIDE SERPL-SCNC: 105 MMOL/L (ref 100–108)
CO2 SERPL-SCNC: 30 MMOL/L (ref 21–32)
CREAT SERPL-MCNC: 1.21 MG/DL (ref 0.6–1.3)
EOSINOPHIL # BLD AUTO: 0.09 THOUSAND/ΜL (ref 0–0.61)
EOSINOPHIL NFR BLD AUTO: 1 % (ref 0–6)
ERYTHROCYTE [DISTWIDTH] IN BLOOD BY AUTOMATED COUNT: 13.5 % (ref 11.6–15.1)
GFR SERPL CREATININE-BSD FRML MDRD: 53 ML/MIN/1.73SQ M
GLUCOSE SERPL-MCNC: 147 MG/DL (ref 65–140)
HCT VFR BLD AUTO: 42.7 % (ref 36.5–49.3)
HGB BLD-MCNC: 13.8 G/DL (ref 12–17)
IMM GRANULOCYTES # BLD AUTO: 0.05 THOUSAND/UL (ref 0–0.2)
IMM GRANULOCYTES NFR BLD AUTO: 1 % (ref 0–2)
LYMPHOCYTES # BLD AUTO: 1.17 THOUSANDS/ΜL (ref 0.6–4.47)
LYMPHOCYTES NFR BLD AUTO: 13 % (ref 14–44)
MCH RBC QN AUTO: 32 PG (ref 26.8–34.3)
MCHC RBC AUTO-ENTMCNC: 32.3 G/DL (ref 31.4–37.4)
MCV RBC AUTO: 99 FL (ref 82–98)
MONOCYTES # BLD AUTO: 0.94 THOUSAND/ΜL (ref 0.17–1.22)
MONOCYTES NFR BLD AUTO: 11 % (ref 4–12)
NEUTROPHILS # BLD AUTO: 6.52 THOUSANDS/ΜL (ref 1.85–7.62)
NEUTS SEG NFR BLD AUTO: 74 % (ref 43–75)
NRBC BLD AUTO-RTO: 0 /100 WBCS
P AXIS: 48 DEGREES
PLATELET # BLD AUTO: 217 THOUSANDS/UL (ref 149–390)
PMV BLD AUTO: 11.2 FL (ref 8.9–12.7)
POTASSIUM SERPL-SCNC: 4 MMOL/L (ref 3.5–5.3)
PR INTERVAL: 194 MS
PROT SERPL-MCNC: 6.6 G/DL (ref 6.4–8.2)
QRS AXIS: -63 DEGREES
QRSD INTERVAL: 152 MS
QT INTERVAL: 508 MS
QTC INTERVAL: 498 MS
RBC # BLD AUTO: 4.31 MILLION/UL (ref 3.88–5.62)
SODIUM SERPL-SCNC: 141 MMOL/L (ref 136–145)
T WAVE AXIS: -16 DEGREES
VENTRICULAR RATE: 58 BPM
WBC # BLD AUTO: 8.79 THOUSAND/UL (ref 4.31–10.16)

## 2022-06-06 PROCEDURE — 93005 ELECTROCARDIOGRAM TRACING: CPT

## 2022-06-06 PROCEDURE — 99284 EMERGENCY DEPT VISIT MOD MDM: CPT

## 2022-06-06 PROCEDURE — 80053 COMPREHEN METABOLIC PANEL: CPT | Performed by: EMERGENCY MEDICINE

## 2022-06-06 PROCEDURE — 36415 COLL VENOUS BLD VENIPUNCTURE: CPT | Performed by: EMERGENCY MEDICINE

## 2022-06-06 PROCEDURE — 93010 ELECTROCARDIOGRAM REPORT: CPT | Performed by: INTERNAL MEDICINE

## 2022-06-06 PROCEDURE — 70450 CT HEAD/BRAIN W/O DYE: CPT

## 2022-06-06 PROCEDURE — 85025 COMPLETE CBC W/AUTO DIFF WBC: CPT | Performed by: EMERGENCY MEDICINE

## 2022-06-06 PROCEDURE — ND001 PR NO DOCUMENTATION: Performed by: EMERGENCY MEDICINE

## 2022-06-06 PROCEDURE — G1004 CDSM NDSC: HCPCS

## 2022-06-06 PROCEDURE — 84484 ASSAY OF TROPONIN QUANT: CPT | Performed by: EMERGENCY MEDICINE

## 2022-06-07 LAB
ATRIAL RATE: 55 BPM
ATRIAL RATE: 58 BPM
P AXIS: 44 DEGREES
P AXIS: 56 DEGREES
PR INTERVAL: 200 MS
PR INTERVAL: 216 MS
QRS AXIS: -56 DEGREES
QRS AXIS: -64 DEGREES
QRSD INTERVAL: 162 MS
QRSD INTERVAL: 164 MS
QT INTERVAL: 500 MS
QT INTERVAL: 508 MS
QTC INTERVAL: 485 MS
QTC INTERVAL: 490 MS
T WAVE AXIS: -19 DEGREES
T WAVE AXIS: -30 DEGREES
VENTRICULAR RATE: 55 BPM
VENTRICULAR RATE: 58 BPM

## 2022-06-07 PROCEDURE — 93010 ELECTROCARDIOGRAM REPORT: CPT | Performed by: INTERNAL MEDICINE

## 2022-06-07 NOTE — ED NOTES
Pt screaming at RN "I wanna leave!! Ill be here all night for a urine sample " "I feel better now let me go!" RN notified provider at this time      Tatiana Richey RN  06/06/22 0020

## 2022-06-07 NOTE — ED PROVIDER NOTES
History  Chief Complaint   Patient presents with    Nausea     Per family and patient, patient has been experiencing "shaking," lack of appetite, and today started with nausea  Denies fevers  Patient also reporting generalized weakness  Denies pain  59-year-old male with history of diabetes presents to the emergency department for evaluation nausea and lightheadedness  The patient reports that earlier today after eating lunch he was feeling nauseous and was lightheaded  The patient is accompanied by his wife who reports that her  has had multiple recent fall  He denies head strike or using any anti-platelet or anticoagulation medications  The patient's wife also states that her  has had urinary frequency recently  The patient reports that currently all of his symptoms resolved  He denies headache, blurry vision, fevers, chills, nausea, vomiting, diarrhea, chest pain, shortness of breath and localized numbness, tingling or weakness  Prior to Admission Medications   Prescriptions Last Dose Informant Patient Reported? Taking? B Complex Vitamins (B COMPLEX 1 PO)  Self Yes No   Sig: Take 1 tablet by mouth   Calcium Carb-Ergocalciferol 250-125 MG-UNIT TABS  Self Yes No   Sig: Take 1 tablet by mouth daily   FLUOXETINE HCL PO  Self Yes No   Sig: Take 20 mg by mouth   Patient not taking: Reported on 10/28/2021   FLUoxetine (PROzac) 40 MG capsule  Self Yes No   Sig: daily   FLUoxetine (PROzac) 60 MG TABS   Yes No   Patient not taking: Reported on 3/31/2022    aspirin 81 MG tablet  Self Yes No   Sig: Take 81 mg by mouth daily     Patient not taking: Reported on 3/31/2022    atorvastatin (LIPITOR) 40 mg tablet  Self Yes No   Sig: Take 40 mg by mouth   carvedilol (COREG) 12 5 mg tablet  Self Yes No   Sig: Take 12 5 mg by mouth   cyanocobalamin (VITAMIN B-12) 100 mcg tablet  Self Yes No   Sig: Take by mouth   fexofenadine (ALLEGRA) 60 MG tablet   No No   Sig: Take 1 tablet (60 mg total) by mouth 2 (two) times a day as needed (Itchiness, rash)   hydrOXYzine HCL (ATARAX) 25 mg tablet   Yes No   losartan (COZAAR) 50 mg tablet   Yes No   losartan-hydrochlorothiazide (HYZAAR) 100-25 MG per tablet  Self Yes No   Sig: Take by mouth daily   Patient not taking: Reported on 3/31/2022    metFORMIN (GLUCOPHAGE-XR) 500 mg 24 hr tablet  Self Yes No   Sig: daily   Patient not taking: Reported on 3/31/2022    metFORMIN (GLUMETZA) 1000 MG (MOD) 24 hr tablet  Self Yes No   Sig: Take 1,000 mg by mouth   Patient not taking: Reported on 3/31/2022    ondansetron (ZOFRAN-ODT) 4 mg disintegrating tablet   No No   Sig: Take 1 tablet (4 mg total) by mouth every 8 (eight) hours as needed for nausea or vomiting for up to 7 days   Patient not taking: Reported on 10/28/2021   sitaGLIPtin (JANUVIA) 25 mg tablet  Self Yes No   Sig: Take 50 mg by mouth    triamcinolone (KENALOG) 0 1 % ointment   No No   Sig: Apply to rash up to four times daily as needed for itchiness     Patient not taking: Reported on 3/31/2022    valsartan (DIOVAN) 160 mg tablet   Yes No   Patient not taking: Reported on 3/31/2022       Facility-Administered Medications: None       Past Medical History:   Diagnosis Date    Bladder trabeculation     Diabetes mellitus [facility] [address] [facility]     Hyperlipidemia     Hypertension     Irradiation cystitis     Malignant neoplasm of dome of bladder (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 [address] [facility]     Malignant neoplasm of overlapping sites of bladder ([facility] [address] )     Malignant neoplasm prostate ([address] )     Nocturia     Stroke ([facility] [address] [facility]     Type 2 diabetes mellitus ([facility] [address] )        Past Surgical History:   Procedure Laterality Date    APPENDECTOMY      BLADDER FULGURATION  2010    < 0 5 cm    CATARACT EXTRACTION, BILATERAL Bilateral     CYSTOSCOPY  2011, 2012, 2013, 2014, 2015, 2016, 2017    CYSTOSCOPY W/ RETROGRADES Bilateral 2015    CYSTOSTOMY W/ BLADDER BIOPSY  2015, 2016    GASTRECTOMY      GASTRIC BYPASS      for obesity   TRANSURETHRAL RESECTION OF BLADDER TUMOR  , ,        Family History   Problem Relation Age of Onset    Heart attack Daughter      I have reviewed and agree with the history as documented  E-Cigarette/Vaping     E-Cigarette/Vaping Substances     Social History     Tobacco Use    Smoking status: Former Smoker     Types: Cigarettes     Quit date:      Years since quittin 4    Smokeless tobacco: Never Used   Substance Use Topics    Alcohol use: Yes     Alcohol/week: 2 0 standard drinks     Types: 2 Cans of beer per week    Drug use: No        Review of Systems   Constitutional: Negative for chills and fever  HENT: Negative for ear pain and sore throat  Eyes: Negative for pain and visual disturbance  Respiratory: Negative for cough and shortness of breath  Cardiovascular: Negative for chest pain and palpitations  Gastrointestinal: Positive for nausea  Negative for abdominal pain and vomiting  Genitourinary: Positive for frequency  Negative for dysuria and hematuria  Musculoskeletal: Negative for arthralgias and back pain  Skin: Negative for color change and rash  Neurological: Positive for tremors and light-headedness  Negative for seizures and syncope  All other systems reviewed and are negative  Physical Exam  ED Triage Vitals [22 1745]   Temperature Pulse Respirations Blood Pressure SpO2   98 1 °F (36 7 °C) 63 18 94/59 95 %      Temp Source Heart Rate Source Patient Position - Orthostatic VS BP Location FiO2 (%)   Oral Monitor Sitting Left arm --      Pain Score       No Pain             Orthostatic Vital Signs  Vitals:    22 1745 22 2042 22 2316   BP: 94/59 137/66 158/73   Pulse: 63 61 56   Patient Position - Orthostatic VS: Sitting Lying Lying       Physical Exam  Vitals and nursing note reviewed  Constitutional:       Appearance: He is well-developed  HENT:      Head: Normocephalic and atraumatic     Eyes: Conjunctiva/sclera: Conjunctivae normal    Cardiovascular:      Rate and Rhythm: Normal rate and regular rhythm  Heart sounds: No murmur heard  Pulmonary:      Effort: Pulmonary effort is normal  No respiratory distress  Breath sounds: Normal breath sounds  Abdominal:      Palpations: Abdomen is soft  Tenderness: There is no abdominal tenderness  Musculoskeletal:      Cervical back: Neck supple  Skin:     General: Skin is warm and dry  Neurological:      Mental Status: He is alert and oriented to person, place, and time  GCS: GCS eye subscore is 4  GCS verbal subscore is 5  GCS motor subscore is 6  Cranial Nerves: Cranial nerves are intact  Sensory: Sensation is intact  Motor: Motor function is intact  Coordination: Coordination is intact  Gait: Gait is intact           ED Medications  Medications - No data to display    Diagnostic Studies  Results Reviewed     Procedure Component Value Units Date/Time    HS Troponin I 2hr [[id number]]  (Abnormal) Collected: 06/06/22 2307    Lab Status: Final result Specimen: Blood from Arm, Left Updated: 06/06/22 2337     hs TnI 2hr 73 ng/L      Delta 2hr hsTnI -11 ng/L     HS Troponin I 4hr [[id number]]     Lab Status: No result Specimen: Blood     HS Troponin 0hr (reflex protocol) [[id number]]  (Abnormal) Collected: 06/06/22 2108    Lab Status: Final result Specimen: Blood from Arm, Left Updated: 06/06/22 2217     hs TnI 0hr 84 ng/L     Comprehensive metabolic panel [[id number]]  (Abnormal) Collected: 06/06/22 2108    Lab Status: Final result Specimen: Blood from Arm, Left Updated: 06/06/22 2210     Sodium 141 mmol/L      Potassium 4 0 mmol/L      Chloride 105 mmol/L      CO2 30 mmol/L      ANION GAP 6 mmol/L      BUN 15 mg/dL      Creatinine 1 21 mg/dL      Glucose 147 mg/dL      Calcium 8 6 mg/dL      Corrected Calcium 9 1 mg/dL      AST 19 U/L      ALT 26 U/L      Alkaline Phosphatase 52 U/L      Total Protein 6 6 g/dL Albumin 3 4 g/dL      Total Bilirubin 0 66 mg/dL      eGFR 53 ml/min/1 73sq m     Narrative:      [place] guidelines for Chronic Kidney Disease (CKD):     Stage 1 with normal or high GFR (GFR > 90 mL/min/1 73 square meters)    Stage 2 Mild CKD (GFR = 60-89 mL/min/1 73 square meters)    Stage 3A Moderate CKD (GFR = 45-59 mL/min/1 73 square meters)    Stage 3B Moderate CKD (GFR = 30-44 mL/min/1 73 square meters)    Stage 4 Severe CKD (GFR = 15-29 mL/min/1 73 square meters)    Stage 5 End Stage CKD (GFR <15 mL/min/1 73 square meters)  Note: GFR calculation is accurate only with a steady state creatinine    CBC and differential [[id number]]  (Abnormal) Collected: 06/06/22 2108    Lab Status: Final result Specimen: Blood from Arm, Left Updated: 06/06/22 2142     WBC 8 79 Thousand/uL      RBC 4 31 Million/uL      Hemoglobin 13 8 g/dL      Hematocrit 42 7 %      MCV 99 fL      MCH 32 0 pg      MCHC 32 3 g/dL      RDW 13 5 %      MPV 11 2 fL      Platelets 299 Thousands/uL      nRBC 0 /100 WBCs      Neutrophils Relative 74 %      Immat GRANS % 1 %      Lymphocytes Relative 13 %      Monocytes Relative 11 %      Eosinophils Relative 1 %      Basophils Relative 0 %      Neutrophils Absolute 6 52 Thousands/µL      Immature Grans Absolute 0 05 Thousand/uL      Lymphocytes Absolute 1 17 Thousands/µL      Monocytes Absolute 0 94 Thousand/µL      Eosinophils Absolute 0 09 Thousand/µL      Basophils Absolute 0 02 Thousands/µL                  CT head without contrast   Final Result by Malena Mercado MD (06/06 2102)      No acute intracranial abnormality  Microangiopathic changes                    Workstation performed: [id number]               Procedures  ECG 12 Lead Documentation Only    Date/Time: 6/6/2022 8:50 PM  Performed by: Gregoria Nolan MD  Authorized by: Gregoria Nolan MD     ECG reviewed by me, the ED Provider: yes    Patient location:  ED  Previous ECG:     Previous ECG: Compared to current    Similarity:  Changes noted    Comparison to cardiac monitor: Yes    Interpretation:     Interpretation: abnormal    Rate:     ECG rate assessment: normal    Rhythm:     Rhythm: sinus rhythm    Ectopy:     Ectopy: none    QRS:     QRS axis:  Normal  Conduction:     Conduction: abnormal      Abnormal conduction: bifascicular block    ST segments:     ST segments:  Non-specific  T waves:     T waves: non-specific            ED Course                                       MDM  Number of Diagnoses or Management Options  Lightheadedness  Diagnosis management comments: 51-year-old male presented to the emergency department for evaluation of lightheadedness  Arrival the patient was awake, alert, oriented, asymptomatic and in no acute distress  Workup done in the emergency department showed the patient had an initial troponin in the 80s  On re-evaluation the patient continued to be asymptomatic and was requesting discharge  Recommendation was made for the patient to stay for an additional troponin level  Patient initially refusing but then was agreeable after risks were discussed  Delta troponin was negative  All diagnostic studies were discussed with the patient in detail  The patient continued to be asymptomatic  He is appropriate for discharge at this time with recommendation to follow up with his PCP  Return precautions were discussed  Patient agrees with the plan for discharge and feels comfortable to go home with proper f/u  Advised to return for worsening or additional problems  Diagnostic tests were reviewed and questions answered  Diagnosis, care plan and treatment options were discussed  The patient understands instructions and will follow up as directed          Disposition  Final diagnoses:   Lightheadedness     Time reflects when diagnosis was documented in both MDM as applicable and the Disposition within this note     Time User Action Codes Description Comment    6/6/2022 11:42 PM Wale Chapman Add [R42] [address] [facility]       ED Disposition     ED Disposition   Discharge    Condition   Stable    Date/Time   Mon Jun 6, 2022 11:42 PM    Comment   Elvin Thrasher discharge to home/self care  Follow-up Information     Follow up With Specialties Details Why Contact Info Additional Information    [address], DO Family Medicine Schedule an appointment as soon as possible for a visit   [address] [address] [place]  Emergency Department Emergency Medicine Go to  If symptoms worsen [facility] [id number]  [address] [place] [address] Emergency Department, [address] [facility] [address]  , [place], [place], [address]          Discharge Medication List as of 6/6/2022 11:42 PM      CONTINUE these medications which have NOT CHANGED    Details   aspirin 81 MG tablet Take 81 mg by mouth daily  , Historical Med      atorvastatin (LIPITOR) 40 mg tablet Take 40 mg by mouth, Starting Wed 2/3/2016, Historical Med      B Complex Vitamins (B COMPLEX 1 PO) Take 1 tablet by mouth, Historical Med      Calcium Carb-Ergocalciferol 250-125 MG-UNIT TABS Take 1 tablet by mouth daily, Historical Med      carvedilol (COREG) 12 5 mg tablet Take 12 5 mg by mouth, Starting Wed 2/3/2016, Historical Med      cyanocobalamin (VITAMIN B-12) 100 mcg tablet Take by mouth, Historical Med      fexofenadine (ALLEGRA) 60 MG tablet Take 1 tablet (60 mg total) by mouth 2 (two) times a day as needed (Itchiness, rash), Starting Mon 1/31/2022, Normal      !! FLUoxetine (PROzac) 40 MG capsule daily, Starting Mon 3/5/2018, Historical Med      !!  FLUoxetine (PROzac) 60 MG TABS Starting Wed 2/23/2022, Historical Med      !! FLUOXETINE HCL PO Take 20 mg by mouth, Historical Med      hydrOXYzine HCL (ATARAX) 25 mg tablet Starting Thu 12/30/2021, Historical Med      losartan (COZAAR) 50 mg tablet Starting Tue 12/21/2021, Historical Med      losartan-hydrochlorothiazide (HYZAAR) 100-25 MG per tablet Take by mouth daily, Historical Med      metFORMIN (GLUCOPHAGE-XR) 500 mg 24 hr tablet daily, Starting Tue 2/13/2018, Historical Med      metFORMIN (GLUMETZA) 1000 MG (MOD) 24 hr tablet Take 1,000 mg by mouth, Historical Med      ondansetron (ZOFRAN-ODT) 4 mg disintegrating tablet Take 1 tablet (4 mg total) by mouth every 8 (eight) hours as needed for nausea or vomiting for up to 7 days, Starting Sat 9/22/2018, Until Sat 9/29/2018, Print      sitaGLIPtin (JANUVIA) 25 mg tablet Take 50 mg by mouth , Historical Med      triamcinolone (KENALOG) 0 1 % ointment Apply to rash up to four times daily as needed for itchiness , Normal      valsartan (DIOVAN) 160 mg tablet Starting Mon 1/20/2020, Historical Med       !! - Potential duplicate medications found  Please discuss with provider  No discharge procedures on file  PDMP Review     None           ED Provider  Attending physically available and evaluated Kannan Calero I managed the patient along with the ED Attending      Electronically Signed by         Cristian Ragsdale MD  06/07/22 5141

## 2022-06-15 LAB
ATRIAL RATE: 58 BPM
P AXIS: 48 DEGREES
PR INTERVAL: 194 MS
QRS AXIS: -63 DEGREES
QRSD INTERVAL: 152 MS
QT INTERVAL: 508 MS
QTC INTERVAL: 498 MS
T WAVE AXIS: -16 DEGREES
VENTRICULAR RATE: 58 BPM

## 2022-06-15 PROCEDURE — 93010 ELECTROCARDIOGRAM REPORT: CPT | Performed by: INTERNAL MEDICINE

## 2022-07-22 ENCOUNTER — OFFICE VISIT (OUTPATIENT)
Dept: UROLOGY | Facility: MEDICAL CENTER | Age: 87
End: 2022-07-22
Payer: COMMERCIAL

## 2022-07-22 VITALS
BODY MASS INDEX: 29.63 KG/M2 | SYSTOLIC BLOOD PRESSURE: 128 MMHG | HEIGHT: 70 IN | DIASTOLIC BLOOD PRESSURE: 68 MMHG | WEIGHT: 207 LBS

## 2022-07-22 DIAGNOSIS — N39.41 URGE INCONTINENCE: ICD-10-CM

## 2022-07-22 DIAGNOSIS — Z85.46 HISTORY OF PROSTATE CANCER: ICD-10-CM

## 2022-07-22 DIAGNOSIS — Z85.51 HISTORY OF BLADDER CANCER: Primary | ICD-10-CM

## 2022-07-22 DIAGNOSIS — N39.41 URGE INCONTINENCE OF URINE: ICD-10-CM

## 2022-07-22 LAB
POST-VOID RESIDUAL VOLUME, ML POC: 6 ML
SL AMB  POCT GLUCOSE, UA: ABNORMAL
SL AMB LEUKOCYTE ESTERASE,UA: ABNORMAL
SL AMB POCT BILIRUBIN,UA: ABNORMAL
SL AMB POCT BLOOD,UA: ABNORMAL
SL AMB POCT CLARITY,UA: CLEAR
SL AMB POCT COLOR,UA: YELLOW
SL AMB POCT KETONES,UA: ABNORMAL
SL AMB POCT NITRITE,UA: ABNORMAL
SL AMB POCT PH,UA: 5.5
SL AMB POCT SPECIFIC GRAVITY,UA: 1.03
SL AMB POCT URINE PROTEIN: ABNORMAL
SL AMB POCT UROBILINOGEN: 1

## 2022-07-22 PROCEDURE — 99214 OFFICE O/P EST MOD 30 MIN: CPT | Performed by: UROLOGY

## 2022-07-22 PROCEDURE — 51798 US URINE CAPACITY MEASURE: CPT | Performed by: UROLOGY

## 2022-07-22 PROCEDURE — 81003 URINALYSIS AUTO W/O SCOPE: CPT | Performed by: UROLOGY

## 2022-07-22 RX ORDER — TROSPIUM CHLORIDE 20 MG/1
20 TABLET, FILM COATED ORAL 2 TIMES DAILY
Qty: 180 TABLET | Refills: 1 | Status: SHIPPED | OUTPATIENT
Start: 2022-07-22

## 2022-07-22 NOTE — PROGRESS NOTES
Assessment/Plan:    Urinary incontinence  Patient notes urinary urgency and urge incontinence  He is wearing depends and unhappy with his voiding pattern  Urinalysis is negative for infection and postvoid residual today is 6 cc  I recommended a trial of Sanctura 20 mg  Use and side effects were discussed  We will reassess in 2-3 months  History of prostate cancer  He was treated with radiation in 2003  PSA last year was 0 1  We will plan to recheck a PSA level at this time  History of bladder cancer  Negative cystoscopy March 2022  Diagnoses and all orders for this visit:    History of bladder cancer  -     POCT urine dip auto non-scope  -     POCT Measure PVR    History of prostate cancer  -     POCT urine dip auto non-scope  -     PSA Total, Diagnostic; Future  -     POCT Measure PVR    Urge incontinence  -     trospium chloride (SANCTURA) 20 mg tablet; Take 1 tablet (20 mg total) by mouth 2 (two) times a day  -     POCT Measure PVR    Urge incontinence of urine  -     POCT Measure PVR          Subjective:      Patient ID: Shravan Valentino is a 80 y o  male  Chief complaint:  Urge incontinence, history of bladder and prostate cancer    HPI:  72-year-old male followed for the above complaints  He reports that he is unhappy with his voiding pattern  He continues have urinary urgency and urge incontinence  He is wearing depends and soaking through the depends  He voids with an adequate stream   He does feel he empties  He gets up 4 times at night to urinate  There is no gross hematuria or dysuria  He has no new back or flank pain  He returns today for follow-up  He did have cystoscopy done in March  No evidence of recurrent bladder tumor was noted        The following portions of the patient's history were reviewed and updated as appropriate: allergies, current medications, past family history, past medical history, past social history, past surgical history and problem list     Review of Systems   Constitutional: Negative for chills, diaphoresis, fatigue and fever  HENT: Negative  Eyes: Negative  Respiratory: Negative  Cardiovascular: Negative  Endocrine: Negative  Genitourinary:        See HPI   Musculoskeletal: Negative  Skin: Negative  Allergic/Immunologic: Negative  Neurological: Negative  Hematological: Negative  Psychiatric/Behavioral: Negative  AUA SYMPTOM SCORE    Flowsheet Row Most Recent Value   AUA SYMPTOM SCORE    How often have you had a sensation of not emptying your bladder completely after you finished urinating? 0   How often have you had to urinate again less than two hours after you finished urinating? 2   How often have you found you stopped and started again several times when you urinate? 2   How often have you found it difficult to postpone urination? 2   How often have you had a weak urinary stream? 1   How often have you had to push or strain to begin urination? 2   How many times did you most typically get up to urinate from the time you went to bed at night until the time you got up in the morning? 4   Quality of Life: If you were to spend the rest of your life with your urinary condition just the way it is now, how would you feel about that? 2   AUA SYMPTOM SCORE 13        Objective:      /68   Ht 5' 10" (1 778 m)   Wt 93 9 kg (207 lb)   BMI 29 70 kg/m²          Physical Exam  Vitals reviewed  Constitutional:       General: He is not in acute distress  Appearance: Normal appearance  He is well-developed and normal weight  He is not ill-appearing, toxic-appearing or diaphoretic  HENT:      Head: Normocephalic and atraumatic  Eyes:      General: No scleral icterus  Conjunctiva/sclera: Conjunctivae normal    Cardiovascular:      Rate and Rhythm: Normal rate  Pulmonary:      Effort: Pulmonary effort is normal    Abdominal:      General: There is no distension        Palpations: Abdomen is soft  There is no mass  Tenderness: There is no abdominal tenderness  There is no right CVA tenderness, left CVA tenderness, guarding or rebound  Hernia: No hernia is present  Comments: No inguinal hernia   Genitourinary:     Penis: Normal  No tenderness  Testes: Normal    Musculoskeletal:         General: Normal range of motion  Cervical back: Neck supple  Skin:     General: Skin is warm and dry  Neurological:      General: No focal deficit present  Mental Status: He is alert and oriented to person, place, and time  Mental status is at baseline  Psychiatric:         Mood and Affect: Mood normal          Behavior: Behavior normal          Thought Content:  Thought content normal          Judgment: Judgment normal

## 2022-07-22 NOTE — ASSESSMENT & PLAN NOTE
He was treated with radiation in 2003  PSA last year was 0 1  We will plan to recheck a PSA level at this time

## 2022-07-22 NOTE — ASSESSMENT & PLAN NOTE
Patient notes urinary urgency and urge incontinence  He is wearing depends and unhappy with his voiding pattern  Urinalysis is negative for infection and postvoid residual today is 6 cc  I recommended a trial of Sanctura 20 mg  Use and side effects were discussed  We will reassess in 2-3 months

## 2022-07-27 ENCOUNTER — TELEPHONE (OUTPATIENT)
Dept: UROLOGY | Facility: AMBULATORY SURGERY CENTER | Age: 87
End: 2022-07-27

## 2022-07-27 ENCOUNTER — NURSE TRIAGE (OUTPATIENT)
Dept: OTHER | Facility: OTHER | Age: 87
End: 2022-07-27

## 2022-07-27 NOTE — TELEPHONE ENCOUNTER
Patient calling in stating that he is still able to urinate but he is having issues with starting his stream and he often has the urge to pee but cannot get it started right away  He states these symptoms have started since he started taking the [place] and Basil last week  He denies fever, abdominal pain or distention, or hematuria  He states he has some burning with urination occasionally  He stopped taking his Sanctura yesterday and would like to know if he could get a follow up with Dr Efrain Cogan  Advised patient to increase fluids and continue to monitor for symptoms and if he is unable to urinate or develops any pain or blood in urine he should call back immediately  Patient verbalized understanding  Please follow up with patient tomorrow regarding symptoms

## 2022-07-27 NOTE — TELEPHONE ENCOUNTER
Reason for Disposition   Urination is difficult to start (i e , hesitancy) or straining    Answer Assessment - Initial Assessment Questions  1  SYMPTOM: "What's the main symptom you're concerned about?" (e g , frequency, incontinence)      Once in awhile can urinate but having difficulty and burning with urination     2  ONSET: "When did the  symptoms  start?"      Since starting the Sanctura at his last appt  3  PAIN: "Is there any pain?" If Yes, ask: "How bad is it?" (Scale: 1-10; mild, moderate, severe)      Denies abdominal pain  Has some mild burning with urination  4  CAUSE: "What do you think is causing the symptoms?"     Unsure possibly new medication     5   OTHER SYMPTOMS: "Do you have any other symptoms?" (e g , fever, flank pain, blood in urine, pain with urination)      Denies     Stopped taking his Sanctura today    Protocols used: [facility]

## 2022-07-27 NOTE — TELEPHONE ENCOUNTER
Regarding: problem urinating   ----- Message from [facility] sent at 7/27/2022  4:54 PM EDT -----  "I am calling because my  has a problem urinating, he went to the doctor on Monday, he had a different doctor than he usually sees, gave him a medication called Fiona Casillas and he is still unable to urinate   Im unsure if he should keep taking this medication or not "

## 2022-07-27 NOTE — TELEPHONE ENCOUNTER
Call placed to patient  He did not answer  LMOM asking patient to contact the office to further review and assess his symptoms  Office number was provided for the patient to call back and discuss

## 2022-07-27 NOTE — TELEPHONE ENCOUNTER
Patient's wife called and stated that the patient is not urinating after given medication on his last visit       Patient's wife would like a call back as soon as possible at [phone]

## 2022-07-29 NOTE — TELEPHONE ENCOUNTER
Call placed to patient  He did not answer  Phone picked up but I was unable to leave a message  Will try to contact patient later to schedule appointment with Dr Lola Jean Baptiste

## 2022-08-01 NOTE — TELEPHONE ENCOUNTER
Spoke to pt  He states he is doing fine since stopping the [place] and Basil trial prescribed by Dr Andrew Rico at 7/22/22 OV for urinary urgency and urge incontinence  Pt stated he only wants to see Dr Leyla Aguilera in the future  He is scheduled for next fu with Dr Leyla Aguilera